# Patient Record
Sex: FEMALE | Race: WHITE | Employment: OTHER | URBAN - METROPOLITAN AREA
[De-identification: names, ages, dates, MRNs, and addresses within clinical notes are randomized per-mention and may not be internally consistent; named-entity substitution may affect disease eponyms.]

---

## 2022-06-22 ENCOUNTER — HOSPITAL ENCOUNTER (INPATIENT)
Age: 87
LOS: 4 days | Discharge: NURSING, REHAB OR LONG-TERM CARE | DRG: 522 | End: 2022-06-27
Attending: EMERGENCY MEDICINE | Admitting: FAMILY MEDICINE
Payer: MEDICARE

## 2022-06-22 DIAGNOSIS — I48.91 ATRIAL FIBRILLATION WITH RVR (HCC): ICD-10-CM

## 2022-06-22 DIAGNOSIS — S72.002A CLOSED DISPLACED FRACTURE OF LEFT FEMORAL NECK (HCC): ICD-10-CM
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
## 2022-06-22 DIAGNOSIS — S72.002A LEFT DISPLACED FEMORAL NECK FRACTURE (HCC): ICD-10-CM

## 2022-06-22 DIAGNOSIS — G89.18 ACUTE POST-OPERATIVE PAIN: ICD-10-CM

## 2022-06-22 PROCEDURE — 85610 PROTHROMBIN TIME: CPT

## 2022-06-22 PROCEDURE — 84484 ASSAY OF TROPONIN QUANT: CPT

## 2022-06-22 PROCEDURE — 99285 EMERGENCY DEPT VISIT HI MDM: CPT

## 2022-06-22 PROCEDURE — 85730 THROMBOPLASTIN TIME PARTIAL: CPT

## 2022-06-22 PROCEDURE — 51702 INSERT TEMP BLADDER CATH: CPT

## 2022-06-22 PROCEDURE — 80048 BASIC METABOLIC PNL TOTAL CA: CPT

## 2022-06-22 PROCEDURE — 36415 COLL VENOUS BLD VENIPUNCTURE: CPT

## 2022-06-22 PROCEDURE — 85025 COMPLETE CBC W/AUTO DIFF WBC: CPT

## 2022-06-22 PROCEDURE — 96374 THER/PROPH/DIAG INJ IV PUSH: CPT

## 2022-06-22 NOTE — LETTER
[address] Medicaid  CERTIFICATION OF NECESSITY  FOR TRANSPORTATION   BY WHEELCHAIR VAN     Individual Information   1. Name: Ilene Garcia 2. [address] [place] [address] Dr Medicaid Billing Number:    3. Address: P.o [facility] Provider Information   4. Provider Name:    5. [address] [place] [address] Dr Medicaid Provider Number:  National Provider Identifier (NPI):      Certification  7. Criteria:  By signing this document, the practitioner certifies that two statements are true:  A. This individual must be accompanied by a mobility-related assistive device from the point of pick-up to the point of drop-off. B. Transport of this individual by standard passenger vehicle or common carrier is precluded or contraindicated. 8. Period Beginning Date: 6/27/22   9. Length  [x] Not more than 1 day(s)  [] One Year     Additional Information Relevant to Certification   10. Comments or Explanations, If Necessary or Appropriate    Atrial fibrillation with RVR ([address] Left displaced femoral neck fracture ([address] Fall, on 2L NC,  L hip hemiarthroplasty 6/80     Certifying Practitioner Information   11. Name of Practitioner: Bhavani Bai MD   12. [address] [place] [address] Dr Medicaid Provider Number, If Applicable:  Ivan Bautista Provider Identifier (NPI):      Signature Information   14. Date of Signature: 6/27/22 15. Name of Person Signing: Angus Moreno   12. Signature and Professional Designation: Electronically signed by TAYLOR Ag on 6/27/2022 at 12:54 PM     OD 15157  Rev. 7/2015    [address] Encounter Date/Time: 6/22/2022 [address] Account: [de-identified]    MRN: [id number]    Patient: Ilene Garcia    Contact Serial #: [id number]      ENCOUNTER          Patient Class: I Private Enc? No Unit RM BDAmy [facility] 6406/6406-A   Hospital Service: MED   Encounter DX:  Atrial fibrillation with*   ADM Provider: Sugey Page DO   Procedure:     ATT Provider: Bhavani Bai MD   REF Provider:        Admission DX: Atrial fibrillation with RVR ([facility] Left displaced femoral neck fracture ([facility] Fall, initial encounter and DX codes: I48.91, S72.002A, W19. Dony Hong      PATIENT                 Name: Mann Hinojosa : 1935 (87 yrs)   Address: [facility] 5 Sex: Female   City: [address] Marital Status:    Employer: RETIRED         Hindu: [id number]   Primary Care Provider: Bibiana Aceves MD         Primary Phone: [phone]   EMERGENCY CONTACT   Contact Name Legal Guardian? Relationship to Patient Home Phone Work Phone   1. Heather Lion  2. Mayra Hartmanjuana No  No Child  Child [phone]              GUARANTOR            Guarantor: Mann Hinojosa     : 1935   Address: [address] Sex: Female     [place] [address]     Relation to Patient: Self       Home Phone: [phone]   Guarantor ID: [id number]       Work Phone:     Guarantor Employer: RETIRED         Status: RETIRED      COVERAGE        PRIMARY INSURANCE   Payor: [address] MEDICARE Plan: [address] MEDICARE ADVANTAGE*   Payor Address: [facility] [id number], [place] [address]       Group Number: [id number] Insurance Type: [id number] Name: Guy Dill : 1935   Subscriber ID: [id number] [facility]. Rel. to Sub: Self   SECONDARY INSURANCE   Payor:   Plan:     Payor Address:  ,           Group Number:   Insurance Type:     Subscriber Name:   Subscriber :     Subscriber ID:   Pat.  Rel. to Sub:             CSN: [id number]

## 2022-06-23 ENCOUNTER — APPOINTMENT (OUTPATIENT)
Dept: GENERAL RADIOLOGY | Age: 87
DRG: 522 | End: 2022-06-23
Payer: MEDICARE

## 2022-06-23 ENCOUNTER — ANESTHESIA EVENT (OUTPATIENT)
Dept: OPERATING ROOM | Age: 87
DRG: 522 | End: 2022-06-23
Payer: MEDICARE

## 2022-06-23 ENCOUNTER — ANESTHESIA (OUTPATIENT)
Dept: OPERATING ROOM | Age: 87
DRG: 522 | End: 2022-06-23
Payer: MEDICARE

## 2022-06-23 PROBLEM — I48.91 ATRIAL FIBRILLATION WITH RVR (HCC): Status: ACTIVE | Noted: 2022-06-23

## 2022-06-23 LAB
ALBUMIN SERPL-MCNC: 4.3 G/DL (ref 3.5–5.2)
ALP BLD-CCNC: 72 U/L (ref 35–104)
ALT SERPL-CCNC: 17 U/L (ref 0–32)
ANION GAP SERPL CALCULATED.3IONS-SCNC: 14 MMOL/L (ref 7–16)
ANION GAP SERPL CALCULATED.3IONS-SCNC: 15 MMOL/L (ref 7–16)
APTT: 35.5 SEC (ref 24.5–35.1)
AST SERPL-CCNC: 21 U/L (ref 0–31)
BASOPHILS ABSOLUTE: 0.03 E9/L (ref 0–0.2)
BASOPHILS ABSOLUTE: 0.04 E9/L (ref 0–0.2)
BASOPHILS RELATIVE PERCENT: 0.3 % (ref 0–2)
BASOPHILS RELATIVE PERCENT: 0.4 % (ref 0–2)
BILIRUB SERPL-MCNC: 0.9 MG/DL (ref 0–1.2)
BUN BLDV-MCNC: 15 MG/DL (ref 6–23)
BUN BLDV-MCNC: 18 MG/DL (ref 6–23)
CALCIUM SERPL-MCNC: 9.1 MG/DL (ref 8.6–10.2)
CALCIUM SERPL-MCNC: 9.2 MG/DL (ref 8.6–10.2)
CHLORIDE BLD-SCNC: 101 MMOL/L (ref 98–107)
CHLORIDE BLD-SCNC: 102 MMOL/L (ref 98–107)
CO2: 23 MMOL/L (ref 22–29)
CO2: 25 MMOL/L (ref 22–29)
CREAT SERPL-MCNC: 0.8 MG/DL (ref 0.5–1)
CREAT SERPL-MCNC: 0.9 MG/DL (ref 0.5–1)
EKG ATRIAL RATE: 304 BPM
EKG Q-T INTERVAL: 298 MS
EKG QRS DURATION: 98 MS
EKG QTC CALCULATION (BAZETT): 473 MS
EKG R AXIS: -48 DEGREES
EKG T AXIS: 123 DEGREES
EKG VENTRICULAR RATE: 152 BPM
EOSINOPHILS ABSOLUTE: 0.01 E9/L (ref 0.05–0.5)
EOSINOPHILS ABSOLUTE: 0.18 E9/L (ref 0.05–0.5)
EOSINOPHILS RELATIVE PERCENT: 0.1 % (ref 0–6)
EOSINOPHILS RELATIVE PERCENT: 1.8 % (ref 0–6)
GFR AFRICAN AMERICAN: >60
GFR AFRICAN AMERICAN: >60
GFR NON-AFRICAN AMERICAN: 60 ML/MIN/1.73
GFR NON-AFRICAN AMERICAN: >60 ML/MIN/1.73
GLUCOSE BLD-MCNC: 154 MG/DL (ref 74–99)
GLUCOSE BLD-MCNC: 172 MG/DL (ref 74–99)
HCT VFR BLD CALC: 40.8 % (ref 34–48)
HCT VFR BLD CALC: 42.2 % (ref 34–48)
HEMOGLOBIN: 13.3 G/DL (ref 11.5–15.5)
HEMOGLOBIN: 13.6 G/DL (ref 11.5–15.5)
IMMATURE GRANULOCYTES #: 0.05 E9/L
IMMATURE GRANULOCYTES #: 0.06 E9/L
IMMATURE GRANULOCYTES %: 0.5 % (ref 0–5)
IMMATURE GRANULOCYTES %: 0.5 % (ref 0–5)
INR BLD: 2.2
INR BLD: 2.2
LYMPHOCYTES ABSOLUTE: 0.74 E9/L (ref 1.5–4)
LYMPHOCYTES ABSOLUTE: 0.95 E9/L (ref 1.5–4)
LYMPHOCYTES RELATIVE PERCENT: 6.3 % (ref 20–42)
LYMPHOCYTES RELATIVE PERCENT: 9.5 % (ref 20–42)
MCH RBC QN AUTO: 31 PG (ref 26–35)
MCH RBC QN AUTO: 31.3 PG (ref 26–35)
MCHC RBC AUTO-ENTMCNC: 32.2 % (ref 32–34.5)
MCHC RBC AUTO-ENTMCNC: 32.6 % (ref 32–34.5)
MCV RBC AUTO: 96 FL (ref 80–99.9)
MCV RBC AUTO: 96.1 FL (ref 80–99.9)
MONOCYTES ABSOLUTE: 0.51 E9/L (ref 0.1–0.95)
MONOCYTES ABSOLUTE: 0.64 E9/L (ref 0.1–0.95)
MONOCYTES RELATIVE PERCENT: 5.1 % (ref 2–12)
MONOCYTES RELATIVE PERCENT: 5.4 % (ref 2–12)
NEUTROPHILS ABSOLUTE: 10.33 E9/L (ref 1.8–7.3)
NEUTROPHILS ABSOLUTE: 8.29 E9/L (ref 1.8–7.3)
NEUTROPHILS RELATIVE PERCENT: 82.7 % (ref 43–80)
NEUTROPHILS RELATIVE PERCENT: 87.4 % (ref 43–80)
PDW BLD-RTO: 13.2 FL (ref 11.5–15)
PDW BLD-RTO: 13.2 FL (ref 11.5–15)
PLATELET # BLD: 187 E9/L (ref 130–450)
PLATELET # BLD: 189 E9/L (ref 130–450)
PMV BLD AUTO: 10.1 FL (ref 7–12)
PMV BLD AUTO: 9.9 FL (ref 7–12)
POTASSIUM REFLEX MAGNESIUM: 4.2 MMOL/L (ref 3.5–5)
POTASSIUM REFLEX MAGNESIUM: 4.3 MMOL/L (ref 3.5–5)
PROTHROMBIN TIME: 23.9 SEC (ref 9.3–12.4)
PROTHROMBIN TIME: 24.1 SEC (ref 9.3–12.4)
RBC # BLD: 4.25 E12/L (ref 3.5–5.5)
RBC # BLD: 4.39 E12/L (ref 3.5–5.5)
SODIUM BLD-SCNC: 139 MMOL/L (ref 132–146)
SODIUM BLD-SCNC: 141 MMOL/L (ref 132–146)
TOTAL PROTEIN: 7 G/DL (ref 6.4–8.3)
TROPONIN, HIGH SENSITIVITY: 17 NG/L (ref 0–9)
TROPONIN, HIGH SENSITIVITY: 20 NG/L (ref 0–9)
WBC # BLD: 10 E9/L (ref 4.5–11.5)
WBC # BLD: 11.8 E9/L (ref 4.5–11.5)

## 2022-06-23 PROCEDURE — 6360000002 HC RX W HCPCS: Performed by: INTERNAL MEDICINE

## 2022-06-23 PROCEDURE — 6360000002 HC RX W HCPCS: Performed by: ORTHOPAEDIC SURGERY

## 2022-06-23 PROCEDURE — 7100000000 HC PACU RECOVERY - FIRST 15 MIN: Performed by: ORTHOPAEDIC SURGERY

## 2022-06-23 PROCEDURE — 3700000000 HC ANESTHESIA ATTENDED CARE: Performed by: ORTHOPAEDIC SURGERY

## 2022-06-23 PROCEDURE — 7100000001 HC PACU RECOVERY - ADDTL 15 MIN: Performed by: ORTHOPAEDIC SURGERY

## 2022-06-23 PROCEDURE — C1713 ANCHOR/SCREW BN/BN,TIS/BN: HCPCS | Performed by: ORTHOPAEDIC SURGERY

## 2022-06-23 PROCEDURE — 85610 PROTHROMBIN TIME: CPT

## 2022-06-23 PROCEDURE — 0SRS0J9 REPLACEMENT OF LEFT HIP JOINT, FEMORAL SURFACE WITH SYNTHETIC SUBSTITUTE, CEMENTED, OPEN APPROACH: ICD-10-PCS | Performed by: ORTHOPAEDIC SURGERY

## 2022-06-23 PROCEDURE — 88311 DECALCIFY TISSUE: CPT

## 2022-06-23 PROCEDURE — 2580000003 HC RX 258: Performed by: FAMILY MEDICINE

## 2022-06-23 PROCEDURE — 99223 1ST HOSP IP/OBS HIGH 75: CPT | Performed by: INTERNAL MEDICINE

## 2022-06-23 PROCEDURE — 2500000003 HC RX 250 WO HCPCS: Performed by: STUDENT IN AN ORGANIZED HEALTH CARE EDUCATION/TRAINING PROGRAM

## 2022-06-23 PROCEDURE — 73502 X-RAY EXAM HIP UNI 2-3 VIEWS: CPT

## 2022-06-23 PROCEDURE — 6360000002 HC RX W HCPCS: Performed by: FAMILY MEDICINE

## 2022-06-23 PROCEDURE — C1776 JOINT DEVICE (IMPLANTABLE): HCPCS | Performed by: ORTHOPAEDIC SURGERY

## 2022-06-23 PROCEDURE — 85025 COMPLETE CBC W/AUTO DIFF WBC: CPT

## 2022-06-23 PROCEDURE — 88305 TISSUE EXAM BY PATHOLOGIST: CPT

## 2022-06-23 PROCEDURE — 2500000003 HC RX 250 WO HCPCS

## 2022-06-23 PROCEDURE — 2580000003 HC RX 258: Performed by: ORTHOPAEDIC SURGERY

## 2022-06-23 PROCEDURE — 2580000003 HC RX 258: Performed by: STUDENT IN AN ORGANIZED HEALTH CARE EDUCATION/TRAINING PROGRAM

## 2022-06-23 PROCEDURE — 2500000003 HC RX 250 WO HCPCS: Performed by: EMERGENCY MEDICINE

## 2022-06-23 PROCEDURE — 73552 X-RAY EXAM OF FEMUR 2/>: CPT

## 2022-06-23 PROCEDURE — 2709999900 HC NON-CHARGEABLE SUPPLY: Performed by: ORTHOPAEDIC SURGERY

## 2022-06-23 PROCEDURE — 6370000000 HC RX 637 (ALT 250 FOR IP): Performed by: FAMILY MEDICINE

## 2022-06-23 PROCEDURE — 36415 COLL VENOUS BLD VENIPUNCTURE: CPT

## 2022-06-23 PROCEDURE — 2580000003 HC RX 258

## 2022-06-23 PROCEDURE — 3600000005 HC SURGERY LEVEL 5 BASE: Performed by: ORTHOPAEDIC SURGERY

## 2022-06-23 PROCEDURE — 84484 ASSAY OF TROPONIN QUANT: CPT

## 2022-06-23 PROCEDURE — 6370000000 HC RX 637 (ALT 250 FOR IP)

## 2022-06-23 PROCEDURE — 6360000002 HC RX W HCPCS

## 2022-06-23 PROCEDURE — 3700000001 HC ADD 15 MINUTES (ANESTHESIA): Performed by: ORTHOPAEDIC SURGERY

## 2022-06-23 PROCEDURE — 2140000000 HC CCU INTERMEDIATE R&B

## 2022-06-23 PROCEDURE — 71045 X-RAY EXAM CHEST 1 VIEW: CPT

## 2022-06-23 PROCEDURE — 3600000015 HC SURGERY LEVEL 5 ADDTL 15MIN: Performed by: ORTHOPAEDIC SURGERY

## 2022-06-23 PROCEDURE — 80053 COMPREHEN METABOLIC PANEL: CPT

## 2022-06-23 PROCEDURE — 2700000000 HC OXYGEN THERAPY PER DAY

## 2022-06-23 PROCEDURE — A4217 STERILE WATER/SALINE, 500 ML: HCPCS | Performed by: ORTHOPAEDIC SURGERY

## 2022-06-23 PROCEDURE — 93005 ELECTROCARDIOGRAM TRACING: CPT | Performed by: EMERGENCY MEDICINE

## 2022-06-23 DEVICE — HEAD BPLR OD48MM ID28MM FEM HIP SELF CNTR: Type: IMPLANTABLE DEVICE | Site: HIP | Status: FUNCTIONAL

## 2022-06-23 DEVICE — STEM FEM SZ 3 L108MM NK L32MM 38MM OFFSET 130DEG BILAT HIP: Type: IMPLANTABLE DEVICE | Site: HIP | Status: FUNCTIONAL

## 2022-06-23 DEVICE — CEMENT BNE GENTAMICIN 40 GM SMARTSET GMV: Type: IMPLANTABLE DEVICE | Site: HIP | Status: FUNCTIONAL

## 2022-06-23 DEVICE — IMPL HIP FEM HEAD TAPR 12/14 28MM +5: Type: IMPLANTABLE DEVICE | Site: HIP | Status: FUNCTIONAL

## 2022-06-23 DEVICE — CENTRALIZER STEM DIA9.25MM DST FEM CEM MOLD SUMMIT BASIC: Type: IMPLANTABLE DEVICE | Site: HIP | Status: FUNCTIONAL

## 2022-06-23 DEVICE — RESTRICTOR CEM M DIA24MM UNIV POLYMER IM INSRT DISP: Type: IMPLANTABLE DEVICE | Site: HIP | Status: FUNCTIONAL

## 2022-06-23 RX ORDER — DILTIAZEM HYDROCHLORIDE 5 MG/ML
10 INJECTION INTRAVENOUS ONCE
Status: COMPLETED | OUTPATIENT
Start: 2022-06-23 | End: 2022-06-23

## 2022-06-23 RX ORDER — FENTANYL CITRATE 50 UG/ML
INJECTION, SOLUTION INTRAMUSCULAR; INTRAVENOUS PRN
Status: DISCONTINUED | OUTPATIENT
Start: 2022-06-23 | End: 2022-06-23 | Stop reason: SDUPTHER

## 2022-06-23 RX ORDER — MECLIZINE HYDROCHLORIDE 25 MG/1
25 TABLET ORAL 3 TIMES DAILY PRN
COMMUNITY

## 2022-06-23 RX ORDER — SODIUM CHLORIDE 0.9 % (FLUSH) 0.9 %
10 SYRINGE (ML) INJECTION PRN
Status: DISCONTINUED | OUTPATIENT
Start: 2022-06-23 | End: 2022-06-27 | Stop reason: HOSPADM

## 2022-06-23 RX ORDER — ONDANSETRON 2 MG/ML
4 INJECTION INTRAMUSCULAR; INTRAVENOUS
Status: DISCONTINUED | OUTPATIENT
Start: 2022-06-23 | End: 2022-06-23 | Stop reason: HOSPADM

## 2022-06-23 RX ORDER — LANOLIN ALCOHOL/MO/W.PET/CERES
800 CREAM (GRAM) TOPICAL DAILY
COMMUNITY

## 2022-06-23 RX ORDER — MORPHINE SULFATE 2 MG/ML
2 INJECTION, SOLUTION INTRAMUSCULAR; INTRAVENOUS
Status: DISCONTINUED | OUTPATIENT
Start: 2022-06-23 | End: 2022-06-27 | Stop reason: HOSPADM

## 2022-06-23 RX ORDER — ONDANSETRON 2 MG/ML
INJECTION INTRAMUSCULAR; INTRAVENOUS PRN
Status: DISCONTINUED | OUTPATIENT
Start: 2022-06-23 | End: 2022-06-23 | Stop reason: SDUPTHER

## 2022-06-23 RX ORDER — SERTRALINE HYDROCHLORIDE 100 MG/1
100 TABLET, FILM COATED ORAL DAILY
Status: DISCONTINUED | OUTPATIENT
Start: 2022-06-23 | End: 2022-06-27 | Stop reason: HOSPADM

## 2022-06-23 RX ORDER — ACETAMINOPHEN 650 MG/1
650 SUPPOSITORY RECTAL EVERY 6 HOURS PRN
Status: DISCONTINUED | OUTPATIENT
Start: 2022-06-23 | End: 2022-06-25

## 2022-06-23 RX ORDER — PROMETHAZINE HYDROCHLORIDE 12.5 MG/1
12.5 TABLET ORAL EVERY 6 HOURS PRN
Status: DISCONTINUED | OUTPATIENT
Start: 2022-06-23 | End: 2022-06-27 | Stop reason: HOSPADM

## 2022-06-23 RX ORDER — BUPROPION HYDROCHLORIDE 200 MG/1
200 TABLET, EXTENDED RELEASE ORAL DAILY
COMMUNITY
Start: 2022-06-11

## 2022-06-23 RX ORDER — SODIUM CHLORIDE 0.9 % (FLUSH) 0.9 %
10 SYRINGE (ML) INJECTION EVERY 12 HOURS SCHEDULED
Status: DISCONTINUED | OUTPATIENT
Start: 2022-06-23 | End: 2022-06-27 | Stop reason: HOSPADM

## 2022-06-23 RX ORDER — BUPROPION HYDROCHLORIDE 100 MG/1
200 TABLET, EXTENDED RELEASE ORAL DAILY
Status: DISCONTINUED | OUTPATIENT
Start: 2022-06-23 | End: 2022-06-27 | Stop reason: HOSPADM

## 2022-06-23 RX ORDER — DILTIAZEM HYDROCHLORIDE 180 MG/1
180 CAPSULE, COATED, EXTENDED RELEASE ORAL 2 TIMES DAILY
Status: DISCONTINUED | OUTPATIENT
Start: 2022-06-23 | End: 2022-06-27 | Stop reason: HOSPADM

## 2022-06-23 RX ORDER — DIGOXIN 0.25 MG/ML
500 INJECTION INTRAMUSCULAR; INTRAVENOUS ONCE
Status: COMPLETED | OUTPATIENT
Start: 2022-06-23 | End: 2022-06-23

## 2022-06-23 RX ORDER — OXYCODONE HYDROCHLORIDE AND ACETAMINOPHEN 5; 325 MG/1; MG/1
1 TABLET ORAL EVERY 6 HOURS PRN
Status: DISCONTINUED | OUTPATIENT
Start: 2022-06-23 | End: 2022-06-27 | Stop reason: HOSPADM

## 2022-06-23 RX ORDER — SODIUM CHLORIDE 9 MG/ML
INJECTION, SOLUTION INTRAVENOUS PRN
Status: DISCONTINUED | OUTPATIENT
Start: 2022-06-23 | End: 2022-06-23 | Stop reason: HOSPADM

## 2022-06-23 RX ORDER — PHENYLEPHRINE HCL IN 0.9% NACL 1 MG/10 ML
SYRINGE (ML) INTRAVENOUS PRN
Status: DISCONTINUED | OUTPATIENT
Start: 2022-06-23 | End: 2022-06-23 | Stop reason: SDUPTHER

## 2022-06-23 RX ORDER — SODIUM CHLORIDE 9 MG/ML
INJECTION, SOLUTION INTRAVENOUS PRN
Status: DISCONTINUED | OUTPATIENT
Start: 2022-06-23 | End: 2022-06-27 | Stop reason: HOSPADM

## 2022-06-23 RX ORDER — CALCIUM CARBONATE 500(1250)
200 TABLET ORAL DAILY
COMMUNITY

## 2022-06-23 RX ORDER — ROCURONIUM BROMIDE 10 MG/ML
INJECTION, SOLUTION INTRAVENOUS PRN
Status: DISCONTINUED | OUTPATIENT
Start: 2022-06-23 | End: 2022-06-23 | Stop reason: SDUPTHER

## 2022-06-23 RX ORDER — DEXAMETHASONE SODIUM PHOSPHATE 10 MG/ML
INJECTION INTRAMUSCULAR; INTRAVENOUS PRN
Status: DISCONTINUED | OUTPATIENT
Start: 2022-06-23 | End: 2022-06-23 | Stop reason: SDUPTHER

## 2022-06-23 RX ORDER — WARFARIN SODIUM 5 MG/1
5 TABLET ORAL
Status: ON HOLD | COMMUNITY
Start: 2022-05-24 | End: 2022-06-27 | Stop reason: HOSPADM

## 2022-06-23 RX ORDER — SODIUM CHLORIDE 9 MG/ML
INJECTION, SOLUTION INTRAVENOUS CONTINUOUS PRN
Status: DISCONTINUED | OUTPATIENT
Start: 2022-06-23 | End: 2022-06-23 | Stop reason: SDUPTHER

## 2022-06-23 RX ORDER — POLYETHYLENE GLYCOL 3350 17 G/17G
17 POWDER, FOR SOLUTION ORAL DAILY PRN
Status: DISCONTINUED | OUTPATIENT
Start: 2022-06-23 | End: 2022-06-27 | Stop reason: HOSPADM

## 2022-06-23 RX ORDER — VANCOMYCIN HYDROCHLORIDE 1 G/20ML
INJECTION, POWDER, LYOPHILIZED, FOR SOLUTION INTRAVENOUS PRN
Status: DISCONTINUED | OUTPATIENT
Start: 2022-06-23 | End: 2022-06-23 | Stop reason: HOSPADM

## 2022-06-23 RX ORDER — MEPERIDINE HYDROCHLORIDE 25 MG/ML
12.5 INJECTION INTRAMUSCULAR; INTRAVENOUS; SUBCUTANEOUS
Status: DISCONTINUED | OUTPATIENT
Start: 2022-06-23 | End: 2022-06-23 | Stop reason: HOSPADM

## 2022-06-23 RX ORDER — OXYCODONE HYDROCHLORIDE AND ACETAMINOPHEN 5; 325 MG/1; MG/1
1 TABLET ORAL EVERY 6 HOURS PRN
Qty: 28 TABLET | Refills: 0 | Status: SHIPPED | OUTPATIENT
Start: 2022-06-23 | End: 2022-06-30

## 2022-06-23 RX ORDER — SODIUM CHLORIDE 0.9 % (FLUSH) 0.9 %
5-40 SYRINGE (ML) INJECTION PRN
Status: DISCONTINUED | OUTPATIENT
Start: 2022-06-23 | End: 2022-06-23 | Stop reason: HOSPADM

## 2022-06-23 RX ORDER — WARFARIN SODIUM 2.5 MG/1
2.5 TABLET ORAL
Status: ON HOLD | COMMUNITY
End: 2022-06-27 | Stop reason: HOSPADM

## 2022-06-23 RX ORDER — SODIUM CHLORIDE 9 MG/ML
INJECTION, SOLUTION INTRAVENOUS CONTINUOUS
Status: DISCONTINUED | OUTPATIENT
Start: 2022-06-23 | End: 2022-06-25

## 2022-06-23 RX ORDER — SODIUM CHLORIDE 0.9 % (FLUSH) 0.9 %
5-40 SYRINGE (ML) INJECTION EVERY 12 HOURS SCHEDULED
Status: DISCONTINUED | OUTPATIENT
Start: 2022-06-23 | End: 2022-06-23 | Stop reason: HOSPADM

## 2022-06-23 RX ORDER — SERTRALINE HYDROCHLORIDE 100 MG/1
100 TABLET, FILM COATED ORAL DAILY
COMMUNITY

## 2022-06-23 RX ORDER — ACETAMINOPHEN 325 MG/1
650 TABLET ORAL EVERY 6 HOURS PRN
Status: DISCONTINUED | OUTPATIENT
Start: 2022-06-23 | End: 2022-06-25

## 2022-06-23 RX ORDER — LIDOCAINE HYDROCHLORIDE 20 MG/ML
INJECTION, SOLUTION EPIDURAL; INFILTRATION; INTRACAUDAL; PERINEURAL PRN
Status: DISCONTINUED | OUTPATIENT
Start: 2022-06-23 | End: 2022-06-23 | Stop reason: SDUPTHER

## 2022-06-23 RX ORDER — PROPOFOL 10 MG/ML
INJECTION, EMULSION INTRAVENOUS PRN
Status: DISCONTINUED | OUTPATIENT
Start: 2022-06-23 | End: 2022-06-23 | Stop reason: SDUPTHER

## 2022-06-23 RX ORDER — FENTANYL CITRATE 50 UG/ML
25 INJECTION, SOLUTION INTRAMUSCULAR; INTRAVENOUS
Status: DISCONTINUED | OUTPATIENT
Start: 2022-06-23 | End: 2022-06-23

## 2022-06-23 RX ORDER — ONDANSETRON 2 MG/ML
4 INJECTION INTRAMUSCULAR; INTRAVENOUS EVERY 6 HOURS PRN
Status: DISCONTINUED | OUTPATIENT
Start: 2022-06-23 | End: 2022-06-27 | Stop reason: HOSPADM

## 2022-06-23 RX ORDER — DILTIAZEM HYDROCHLORIDE 180 MG/1
180 CAPSULE, COATED, EXTENDED RELEASE ORAL 2 TIMES DAILY
COMMUNITY
Start: 2022-06-16

## 2022-06-23 RX ADMIN — DIGOXIN 500 MCG: 0.25 INJECTION INTRAMUSCULAR; INTRAVENOUS at 12:43

## 2022-06-23 RX ADMIN — MORPHINE SULFATE 2 MG: 2 INJECTION, SOLUTION INTRAMUSCULAR; INTRAVENOUS at 22:08

## 2022-06-23 RX ADMIN — ONDANSETRON 4 MG: 2 INJECTION INTRAMUSCULAR; INTRAVENOUS at 18:03

## 2022-06-23 RX ADMIN — FENTANYL CITRATE 50 MCG: 50 INJECTION, SOLUTION INTRAMUSCULAR; INTRAVENOUS at 16:45

## 2022-06-23 RX ADMIN — TRANEXAMIC ACID 1000 MG: 1 INJECTION, SOLUTION INTRAVENOUS at 20:00

## 2022-06-23 RX ADMIN — Medication 100 MCG: at 17:42

## 2022-06-23 RX ADMIN — SUGAMMADEX 200 MG: 100 INJECTION, SOLUTION INTRAVENOUS at 18:30

## 2022-06-23 RX ADMIN — FENTANYL CITRATE 50 MCG: 50 INJECTION, SOLUTION INTRAMUSCULAR; INTRAVENOUS at 17:13

## 2022-06-23 RX ADMIN — SODIUM CHLORIDE: 9 INJECTION, SOLUTION INTRAVENOUS at 05:34

## 2022-06-23 RX ADMIN — MORPHINE SULFATE 2 MG: 2 INJECTION, SOLUTION INTRAMUSCULAR; INTRAVENOUS at 10:56

## 2022-06-23 RX ADMIN — OXYCODONE AND ACETAMINOPHEN 1 TABLET: 5; 325 TABLET ORAL at 09:10

## 2022-06-23 RX ADMIN — SODIUM CHLORIDE: 9 INJECTION, SOLUTION INTRAVENOUS at 19:53

## 2022-06-23 RX ADMIN — DILTIAZEM HYDROCHLORIDE 10 MG: 5 INJECTION INTRAVENOUS at 01:35

## 2022-06-23 RX ADMIN — ONDANSETRON HYDROCHLORIDE 4 MG: 2 SOLUTION INTRAMUSCULAR; INTRAVENOUS at 05:36

## 2022-06-23 RX ADMIN — Medication 10 ML: at 22:11

## 2022-06-23 RX ADMIN — Medication 10 ML: at 09:00

## 2022-06-23 RX ADMIN — DEXTROSE MONOHYDRATE 2.5 MG/HR: 50 INJECTION, SOLUTION INTRAVENOUS at 05:36

## 2022-06-23 RX ADMIN — DEXAMETHASONE SODIUM PHOSPHATE 10 MG: 10 INJECTION INTRAMUSCULAR; INTRAVENOUS at 16:45

## 2022-06-23 RX ADMIN — Medication 100 MG: at 16:45

## 2022-06-23 RX ADMIN — ROCURONIUM BROMIDE 50 MG: 10 SOLUTION INTRAVENOUS at 16:45

## 2022-06-23 RX ADMIN — SODIUM CHLORIDE: 9 INJECTION, SOLUTION INTRAVENOUS at 16:40

## 2022-06-23 RX ADMIN — ROCURONIUM BROMIDE 10 MG: 10 SOLUTION INTRAVENOUS at 17:25

## 2022-06-23 RX ADMIN — DILTIAZEM HYDROCHLORIDE 180 MG: 180 CAPSULE, EXTENDED RELEASE ORAL at 22:08

## 2022-06-23 RX ADMIN — PROPOFOL 70 MG: 10 INJECTION, EMULSION INTRAVENOUS at 16:45

## 2022-06-23 RX ADMIN — MORPHINE SULFATE 2 MG: 2 INJECTION, SOLUTION INTRAMUSCULAR; INTRAVENOUS at 15:21

## 2022-06-23 RX ADMIN — FENTANYL CITRATE 25 MCG: 0.05 INJECTION, SOLUTION INTRAMUSCULAR; INTRAVENOUS at 05:37

## 2022-06-23 RX ADMIN — SODIUM CHLORIDE: 9 INJECTION, SOLUTION INTRAVENOUS at 18:35

## 2022-06-23 RX ADMIN — DILTIAZEM HYDROCHLORIDE 10 MG: 5 INJECTION, SOLUTION INTRAVENOUS at 00:35

## 2022-06-23 RX ADMIN — TRANEXAMIC ACID 1000 MG: 1 INJECTION, SOLUTION INTRAVENOUS at 17:04

## 2022-06-23 RX ADMIN — SERTRALINE 100 MG: 100 TABLET, FILM COATED ORAL at 22:08

## 2022-06-23 RX ADMIN — FENTANYL CITRATE 50 MCG: 50 INJECTION, SOLUTION INTRAMUSCULAR; INTRAVENOUS at 17:29

## 2022-06-23 ASSESSMENT — PAIN DESCRIPTION - LOCATION
LOCATION: HIP
LOCATION: HIP
LOCATION: LEG
LOCATION: HIP

## 2022-06-23 ASSESSMENT — PAIN SCALES - GENERAL
PAINLEVEL_OUTOF10: 0
PAINLEVEL_OUTOF10: 6
PAINLEVEL_OUTOF10: 10

## 2022-06-23 ASSESSMENT — PAIN DESCRIPTION - DESCRIPTORS
DESCRIPTORS: ACHING
DESCRIPTORS: SHARP;SHOOTING;SORE
DESCRIPTORS: ACHING
DESCRIPTORS: ACHING

## 2022-06-23 ASSESSMENT — PAIN DESCRIPTION - ORIENTATION
ORIENTATION: LEFT

## 2022-06-23 ASSESSMENT — LIFESTYLE VARIABLES: SMOKING_STATUS: 0

## 2022-06-23 NOTE — PROGRESS NOTES
Plan for operative treatment of her left hip as soon as today, 6/23/2022, provided this is deemed appropriate by the medical and cardiology services. Appreciate all recommendations and continued management of co-morbidities.

## 2022-06-23 NOTE — ED NOTES
Report called to [facility] [place], RN. No further questions at this time. Pt placed for transport.       Rosa Gonzalez RN  06/23/22 0730

## 2022-06-23 NOTE — ED NOTES
Pt was awaiting transport to floor with RN due to IV infusion. While primary RN was with another pt, Transport left with pt, without belongings, and prior to checking with primary RN to okay transport.       Cory Cardenas RN  06/23/22 6742

## 2022-06-23 NOTE — OP NOTE
Operative Report  Roberto Moreno  [id number]  6/23/2022    Pre-operative diagnosis: Displaced left femoral neck fracture    Post-operative diagnosis: Displaced left femoral neck fracture    Procedure: Left hip hemiarthroplasty    Surgeon: Haley Canales MD    Assistant:  Pamela Jerry DO; PGY-3    Anesthesia:  General    Operative Indication:  82 y.o. female who fell sustaining a displaced left femoral neck fracture. Patient was admitted for pain control, medical optimization and definitive surgical treatment. The rationale behind hip hemiarthroplasty as a means of fracture treatment and early mobilization / rehabilitation was explained and informed consent was obtained following a thorough review of risks, benefits, and alternative treatment options. The typical post-operative recovery process was also outlined. The risks for surgery include bleeding, infection, damage to blood vessels, damage to nerves, risk of further surgery, chronic pain, restricted range of motion, risk of continued discomfort, risk of malunion, risk of nonunion, risk of need for further reconstructive procedures, risk of need for altered activities and altered gait, risk of blood clots, pulmonary embolism, myocardial infarction, and risk of death were discussed. The patient understood these risks and consented for surgery. EBL: 596 cc    Complications: None    Components:  1. Depuy summit cemented stem, size 3                            2. Depuy bipolar head Component, size 48 mm       Operative Procedure: Following general anesthesia, the patient was positioned lateral decubitus with the body supported by a peg board. The affected leg and hip were prepped and draped in the usual standard fashion. The down leg was padded and protected. The hip and groin were sealed with Gladys [place]. An intra-operative time out was called to confirm the planned surgical procedure and administration of IV antibiotic.     A skin incision was made centered over the greater trochanter. A posterior approach to the hip joint was used. The IT band and gluteus fascia were split and a Charnley retractor was placed for deep exposure. Bursal tissue was cleared over the trochanter and short external rotators. An assistant internally rotated the hip and a erwin retractor was placed beneath the gluteus medius muscle. The plane between gluteus minimus and the piriformis tendon was developed using electrocautery. The piriformis and short external rotator tendons were released and tagged with #5 ethibond, along with release of a portion of the quadratus femoris muscle. The capsule was then incised in a T-fashion. It was tagged with #1 ethibond and deep retractors were repositioned. The fracture pattern was that of a displaced subcapital femoral neck with mild comminution at the primary fracture site. The bone appeared non-pathologic. The femoral neck was cut one fingerbreadth above the lesser trochanter using an oscillating saw. The femoral head was removed and was sized. A lollipop trial was then placed to confirm head size. With a size 48 trial head, a good suction fit was obtained. The acetabulum was inspected to make sure there were no free bone fragments. The labrum was intact. The articular cartilage of the cup was in good condition    Attention was now turned to the femoral canal.  The canal was entered using a box osteotome, followed by placement of a . Broaching was initiated with a size 1 broach and was taken up to a size 3 broach in 1mm increments. With the final broach in place, there was good canal fill and the implant was stable with rotation. This was decided to be the appropriate size stem. A size 3 Depuy summit stem was then cemented using modern cement techniques and trialed with a standard neck length and a size 48 bipolar head. With these components, the hip was stable and had excellent ROM.  Final components were then impacted onto the stem and the hip was again reduced. The surgical site was irrigated using pulsatile lavage. The capsule and external rotators were repaired with #5 ethibond through 2 drill holes in the greater trochanter. The IT band and gluteus fascia were closed using #1 Stratafix. The skin was closed in layers using 0, 2-0, vicryl, dermabond and steri strips. A dry, sterile dressing was applied. The patient was then transferred to an orthopaedic bed with an abduction pillow between the legs. The patient was taken to recovery in stable condition.     Celena Whittaker MD

## 2022-06-23 NOTE — PROGRESS NOTES
Hospitalist Progress Note      SYNOPSIS: Patient admitted on 2022 for     Patient presented to the emergency department after a fall. Patient was outside on a swing when she got up to walk back to the house and she tripped falling onto her left side. There is no injury to her head and no loss of consciousness. She fell on her left hip. Now complaining of left hip pain. Denies any chest pain, shortness of breath, fever, chills, nausea, vomiting. Patient's vital signs reveal tachycardia with a rate of 150. EKG showed atrial fibrillation with RVR. Remainder vital signs within normal limits and stable. Patient has a history of atrial fibrillation. Was given diltiazem in the emergency department. Rates currently in the 120s. Patient is chronically anticoagulated on Coumadin. Laboratory studies show an INR of 2.2. X-rays show acute displaced fracture of the left femoral neck. Orthopedics was consulted. Medicine consulted for admission.         SUBJECTIVE:    Patient seen and examined in her room. Still complains of left hip pain, barely controlled. Denies any chest pain, nausea, vomiting  Records reviewed. Stable overnight. No other overnight issues reported. Temp (24hrs), Av.5 °F (36.9 °C), Min:98.2 °F (36.8 °C), Max:98.7 °F (37.1 °C)    DIET: Diet NPO  CODE: Full Code    Intake/Output Summary (Last 24 hours) at 2022 0818  Last data filed at 2022 0706  Gross per 24 hour   Intake --   Output 500 ml   Net -500 ml       OBJECTIVE:    BP (!) 118/94   Pulse (!) 104   Temp 98.2 °F (36.8 °C) (Oral)   Resp 24   Ht 5' 6\" (1.676 m)   Wt 200 lb (90.7 kg)   SpO2 96%   BMI 32.28 kg/m²     General appearance: No apparent distress, appears stated age and cooperative. HEENT:  Conjunctivae/corneas clear. Neck: Supple. No jugular venous distention.    Respiratory: Clear to auscultation bilaterally, normal respiratory effort  Cardiovascular: Regular rate rhythm, normal S1-S2  Abdomen: Soft, nontender, nondistended  Musculoskeletal: No clubbing, cyanosis, no bilateral lower extremity edema. Brisk capillary refill. Skin:  No rashes  on visible skin  Neurologic: awake, alert and following commands       ASSESSMENT:  -Atrial fibrillation with RVR  -Left femoral neck fracture  -Fall at home  -Chronically anticoagulated on Coumadin        PLAN:  -Admit to medicine  -Consult orthopedic surgery  -Consult cardiology for preoperative evaluation/clearance  -Telemetry  -Pain management  -Hold Coumadin and anticoagulants       DISPOSITION:   Unclear discharge disposition at the moment. Medications:  REVIEWED DAILY    Infusion Medications    sodium chloride      sodium chloride 75 mL/hr at 06/23/22 0534    dilTIAZem 5 mg/hr (06/23/22 0556)     Scheduled Medications    buPROPion  200 mg Oral Daily    dilTIAZem  180 mg Oral BID    sertraline  100 mg Oral Daily    sodium chloride flush  10 mL IntraVENous 2 times per day     PRN Meds: sodium chloride flush, sodium chloride, promethazine **OR** ondansetron, polyethylene glycol, acetaminophen **OR** acetaminophen, oxyCODONE-acetaminophen, fentanNYL    Labs:     Recent Labs     06/22/22  2353 06/23/22  0640   WBC 10.0 11.8*   HGB 13.3 13.6   HCT 40.8 42.2    189       Recent Labs     06/22/22  2353 06/23/22  0640    139   K 4.3 4.2    101   CO2 25 23   BUN 18 15   CREATININE 0.9 0.8   CALCIUM 9.2 9.1       Recent Labs     06/23/22  0640   PROT 7.0   ALKPHOS 72   ALT 17   AST 21   BILITOT 0.9       Recent Labs     06/22/22  2353   INR 2.2       No results for input(s): Anithajony Méndez in the last 72 hours.     Chronic labs:    Lab Results   Component Value Date    INR 2.2 06/22/2022       Radiology: REVIEWED DAILY    +++++++++++++++++++++++++++++++++++++++++++++++++  Elton Villanueva MD  [facility] Physician - [address], [place]  +++++++++++++++++++++++++++++++++++++++++++++++++  NOTE: This report was transcribed using voice recognition software. Every effort was made to ensure accuracy; however, inadvertent computerized transcription errors may be present.

## 2022-06-23 NOTE — CONSULTS
CHIEF COMPLAINT:PAF/Hip fracture    HISTORY OF PRESENT ILLNESS: Patient is a 82 y.o. female seen at the request of Dr. Edward Dave and followed by Dr. Zena Murray for PAF. Patient suffered a fall with hip fracture. Noted to be in atrial flutter with RVR prompting cardiology consultation. No CP, angina or SOB. Only complaint is severe hip pain.      Past Medical History:   Diagnosis Date    Atrial fibrillation [facility])        Patient Active Problem List   Diagnosis    Atrial fibrillation with RVR ([facility] [address]       No Known Allergies    Current Facility-Administered Medications   Medication Dose Route Frequency Provider Last Rate Last Admin    buPROPion [facility]) extended release tablet 200 mg  200 mg Oral Daily [facility], DO        dilTIAZem (CARDIZEM CD) extended release capsule 180 mg  180 mg Oral BID [facility], DO        sertraline (ZOLOFT) tablet 100 mg  100 mg Oral Daily [facility], DO        sodium chloride flush 0.9 % injection 10 mL  10 mL IntraVENous 2 times per day [facility], DO        sodium chloride flush 0.9 % injection 10 mL  10 mL IntraVENous PRN [facility], DO        0.9 % sodium chloride infusion   IntraVENous PRN [facility], DO        promethazine (PHENERGAN) tablet 12.5 mg  12.5 mg Oral Q6H PRN [facility], DO        Or    ondansetron [facility]) injection 4 mg  4 mg IntraVENous Q6H PRN [facility], DO   4 mg at 06/23/22 0536    polyethylene glycol (GLYCOLAX) packet 17 g  17 g Oral Daily PRN [facility], DO        acetaminophen (TYLENOL) tablet 650 mg  650 mg Oral Q6H PRN [facility], DO        Or    acetaminophen (TYLENOL) suppository 650 mg  650 mg Rectal Q6H PRN [facility], DO        0.9 % sodium chloride infusion   IntraVENous Continuous [facility], DO 75 mL/hr at 06/23/22 0534 New Bag at 06/23/22 0534    oxyCODONE-acetaminophen (PERCOCET) 5-325 MG per tablet 1 tablet  1 tablet Oral Q6H PRN [facility], DO        fentaNYL (SUBLIMAZE) injection 25 mcg  25 mcg IntraVENous Q1H PRN Berdie Class, DO   25 mcg at 06/23/22 0537    dilTIAZem 100 mg in dextrose 5 % 100 mL infusion ([facility])  2.5-15 mg/hr IntraVENous Continuous Sundra Arrant, DO 5 mL/hr at 06/23/22 0556 5 mg/hr at 06/23/22 0556       Social History     Socioeconomic History    Marital status:      Spouse name: Not on file    Number of children: Not on file    Years of education: Not on file    Highest education level: Not on file   Occupational History    Not on file   Tobacco Use    Smoking status: Never Smoker    Smokeless tobacco: Never Used   Substance and Sexual Activity    Alcohol use: Not Currently    Drug use: Never    Sexual activity: Not on file   Other Topics Concern    Not on file   Social History Narrative    Not on file     Social Determinants of Health     Financial Resource Strain:     Difficulty of Paying Living Expenses: Not on file   Food Insecurity:     Worried About Running Out of Food in the Last Year: Not on file    She of Food in the Last Year: Not on file   Transportation Needs:     Lack of Transportation (Medical): Not on file    Lack of Transportation (Non-Medical):  Not on file   Physical Activity:     Days of Exercise per Week: Not on file    Minutes of Exercise per Session: Not on file   Stress:     Feeling of Stress : Not on file   Social Connections:     Frequency of Communication with Friends and Family: Not on file    Frequency of Social Gatherings with Friends and Family: Not on file    Attends Latter day Services: Not on file    Active Member of Clubs or Organizations: Not on file    Attends Club or Organization Meetings: Not on file    Marital Status: Not on file   Intimate Partner Violence:     Fear of Current or Ex-Partner: Not on file    Emotionally Abused: Not on file    Physically Abused: Not on file    Sexually Abused: Not on file   Housing Stability:     Unable to Pay for Housing in the Last Year: Not on file    Number of Places Lived in the Last Year: Not on file    Unstable Housing in the Last Year: Not on file       History reviewed. No pertinent family history. Review of Systems:   Heart: as above   Lungs: as above   Eyes: denies changes in vision or discharge. Ears: denies changes in hearing or pain. Nose: denies epistaxis or masses   Throat: denies sore throat or trouble swallowing. Neuro: denies numbness, tingling, tremors. Skin: denies rashes or itching. : denies hematuria, dysuria   GI: denies vomiting, diarrhea   Psych: denies mood changed, anxiety, depression. all others negative. Physical Exam   /68   Pulse 94   Temp 97.9 °F (36.6 °C)   Resp 20   Ht 5' 6\" (1.676 m)   Wt 200 lb (90.7 kg)   SpO2 97%   BMI 32.28 kg/m²   Constitutional: Oriented to person, place, and time. Well-developed and well-nourished. No distress. Head: Normocephalic and atraumatic. Eyes: EOM are normal. Pupils are equal, round, and reactive to light. Neck: Normal range of motion. Neck supple. No hepatojugular reflux and no JVD present. Carotid bruit is not present. No tracheal deviation present. No thyromegaly present. Cardiovascular: Normal rate, regular rhythm, normal heart sounds and intact distal pulses. Exam reveals no gallop and no friction rub. No murmur heard. Pulmonary/Chest: Effort normal and breath sounds normal. No respiratory distress. No wheezes. No rales. No tenderness. Abdominal: Soft. Bowel sounds are normal. No distension and no mass. No tenderness. No rebound and no guarding. Musculoskeletal: Normal range of motion. No edema and no tenderness. Lymphadenopathy:   No cervical adenopathy. No groin adenopathy. Neurological: Alert and oriented to person, place, and time. Skin: Skin is warm and dry. No rash noted. Not diaphoretic. No erythema. Psychiatric: Normal mood and affect.  Behavior is normal.     CBC:   Lab Results   Component Value Date    WBC 11.8 06/23/2022    RBC 4.39 06/23/2022    HGB 13.6 06/23/2022    HCT 42.2 06/23/2022    MCV 96.1 06/23/2022    MCH 31.0 06/23/2022    MCHC 32.2 06/23/2022    RDW 13.2 06/23/2022     06/23/2022    MPV 10.1 06/23/2022     BMP:   Lab Results   Component Value Date     06/23/2022    K 4.2 06/23/2022     06/23/2022    CO2 23 06/23/2022    BUN 15 06/23/2022    LABALBU 4.3 06/23/2022    CREATININE 0.8 06/23/2022    CALCIUM 9.1 06/23/2022    GFRAA >60 06/23/2022    LABGLOM >60 06/23/2022     Magnesium:  No results found for: MG  Cardiac Enzymes:   Lab Results   Component Value Date    TROPHS 20 (H) 06/23/2022    TROPHS 17 (H) 06/22/2022      PT/INR:    Lab Results   Component Value Date    PROTIME 23.9 06/22/2022    INR 2.2 06/22/2022     TSH:  No results found for: TSH    Rhythm Strip: Atrial flutter    EKG:  Atrial flutter, nonspecific ST and T waves changes. ASSESSMENT AND PLAN:  Patient Active Problem List   Diagnosis    Atrial fibrillation with RVR ([address]     1. Atrial flutter:     Rate control for now. Typically on warfarin, held for potential surgery. Address restoration of sinus after hip and related pain issue addressed. 2. Fall/Hip Fracture/Pre-op: Patient is an acceptable risk to proceed with surgery as indicated. Ortho consulted. Kristopher Medina D.O.   Cardiologist  Cardiology, [address] [facility]

## 2022-06-23 NOTE — CONSULTS
Orthopaedic Consultation  NGOZI Ewing MD    Reason for Consult:  Left hip pain      HISTORY OF PRESENT ILLNESS:                   Patient is a 82 y.o. female who presents with complaint of left hip pain following mechanical fall earlier today. After the fall she had immediate left hip pain and was unable to ambulate. She does not complain of any pain to any other extremities. She denies any changes in sensation. She has history of right total knee arthroplasty which was completed by surgeon in [place], [place]. On arrival to the ED she was found to be in atrial fibrillation with RVR. She has a history of atrial fibrillation and is on Coumadin. She ambulates with a cane.       Past Medical History:    Past Medical History             Diagnosis Date    Atrial fibrillation [facility])           Past Surgical History:    Past Surgical History   History reviewed. No pertinent surgical history.      Current Medications:     Current Hospital Medications   Current Facility-Administered Medications: buPROPion [facility] SR) extended release tablet 200 mg, 200 mg, Oral, Daily  dilTIAZem (CARDIZEM CD) extended release capsule 180 mg, 180 mg, Oral, BID  sertraline (ZOLOFT) tablet 100 mg, 100 mg, Oral, Daily  sodium chloride flush 0.9 % injection 10 mL, 10 mL, IntraVENous, 2 times per day  sodium chloride flush 0.9 % injection 10 mL, 10 mL, IntraVENous, PRN  0.9 % sodium chloride infusion, , IntraVENous, PRN  promethazine (PHENERGAN) tablet 12.5 mg, 12.5 mg, Oral, Q6H PRN **OR** ondansetron (ZOFRAN) injection 4 mg, 4 mg, IntraVENous, Q6H PRN  polyethylene glycol (GLYCOLAX) packet 17 g, 17 g, Oral, Daily PRN  acetaminophen (TYLENOL) tablet 650 mg, 650 mg, Oral, Q6H PRN **OR** acetaminophen (TYLENOL) suppository 650 mg, 650 mg, Rectal, Q6H PRN  0.9 % sodium chloride infusion, , IntraVENous, Continuous  oxyCODONE-acetaminophen (PERCOCET) 5-325 MG per tablet 1 tablet, 1 tablet, Oral, Q6H PRN  fentaNYL (SUBLIMAZE) injection 25 mcg, 25 mcg, IntraVENous, Q1H PRN  [COMPLETED] dilTIAZem injection 10 mg, 10 mg, IntraVENous, Once **FOLLOWED BY** dilTIAZem 100 mg in dextrose 5 % 100 mL infusion ([facility]), 2.5-15 mg/hr, IntraVENous, Continuous     Allergies:  Patient has no known allergies.     Social History:   TOBACCO:   reports that she has never smoked. She has never used smokeless tobacco.  ETOH:   reports previous alcohol use. DRUGS:   reports no history of drug use. ACTIVITIES OF DAILY LIVING: Ambulates with cane  OCCUPATION: Retired  Family History:   Family History   History reviewed.  No pertinent family history.        REVIEW OF SYSTEMS:  CONSTITUTIONAL:  negative for  fevers, chills  EYES:  negative for blurred vision, visual disturbance  HEENT:  negative for  hearing loss, voice change  RESPIRATORY:  negative for  dyspnea, wheezing  CARDIOVASCULAR:  negative for  chest pain, palpitations  GASTROINTESTINAL:  negative for nausea, vomiting  GENITOURINARY:  negative for frequency, urinary incontinence  HEMATOLOGIC/LYMPHATIC:  negative for bleeding and petechiae  MUSCULOSKELETAL: See HPI  NEUROLOGICAL:  negative for headaches, dizziness  BEHAVIOR/PSYCH:  negative for increased agitation and anxiety     PHYSICAL EXAM:    VITALS:  BP (!) 145/117   Pulse (!) 123   Temp 98.7 °F (37.1 °C)   Resp 21   Ht 5' 6\" (1.676 m)   Wt 200 lb (90.7 kg)   SpO2 94%   BMI 32.28 kg/m²   CONSTITUTIONAL: Hard of hearing, oriented to person and place, cooperative with exam  MUSCULOSKELETAL:  Left lower Extremity:  · Externally rotated left lower extremity with shortening relative to contralateral side  · Pain with logroll, pain with axial loading, no straight leg raise secondary to pain  · Skin intact circumferentially about the extremity, no significant tenderness to palpation about the knee, ankle, or foot  · Sensation grossly intact to the foot in sural, saphenous, tibial, deep peroneal, and superficial peroneal nerve distributions  · Motor function intact to EHL, FHL, gastrocsoleus complex, tibialis anterior  · Dorsalis pedis and posterior tibial pulses are palpable, +2/4  · Compartments soft and compressible     Secondary Exam:   · bilateralUE: No obvious signs of trauma. -TTP to fingers, hand, wrist, forearm, elbow, humerus, shoulder or clavicle. -- Patient able to flex/extend fingers, wrist, elbow and shoulder with active and passive ROM without pain, +2/4 Radial pulse, cap refill <3sec, +AIN/PIN/Radial/Ulnar/Median N, distal sensation grossly intact to C4-T1 dermatomes, compartments soft and compressible.     · rightLE: No obvious signs of trauma. -TTP to foot, ankle, leg, knee, thigh, hip.-- Patient able to flex/extend toes, ankle, knee and hip with active and passive ROM without pain,+2/4 DP & PT pulses, cap refill <3sec, +5/5 PF/DF/EHL, distal sensation grossly intact to L4-S1 dermatomes, compartments soft and compressible.     · Pelvis: -TTP, -Log roll, -Heel strike      DATA:    CBC:         Lab Results   Component Value Date     WBC 10.0 06/22/2022     RBC 4.25 06/22/2022     HGB 13.3 06/22/2022     HCT 40.8 06/22/2022     MCV 96.0 06/22/2022     MCH 31.3 06/22/2022     MCHC 32.6 06/22/2022     RDW 13.2 06/22/2022      06/22/2022     MPV 9.9 06/22/2022      PT/INR:          Lab Results   Component Value Date     PROTIME 23.9 06/22/2022     INR 2.2 06/22/2022         Radiology Review:  X-ray imaging of the left hip and femur reviewed. This demonstrate a transcervical femoral neck fracture. There is complete displacement with shortening into varus.     IMPRESSION:  · Left femoral neck fracture     I had a long discussion with the patient and her family regarding displaced femoral neck fractures. Operative management was discussed. The risks and benefits of surgery were discussed and all questions were answered. She would like to proceed with surgery.     - Medical clearance  - NPO at midnight  - IVF  - NWB LLE  - Pain control  - Plan for left hip hemiarthroplasty     I spent over 50 minutes reviewing the EMR, radiographs, examining the patient, and discussing the injury and treatment plan with the patient and her family.

## 2022-06-23 NOTE — PROGRESS NOTES
Patient seen and examine at bedside. Patient indicated that she is ok with Dr. Brooke Linda performing surgery. She reports some dry mouth. All questions and concerns addressed to patient's satisfaction. Continue plan as indicated in consult note. Patient has been cleared for surgery by cardiology.

## 2022-06-23 NOTE — ED NOTES
Spoke with pt daughter Tommy Hoang) and son in law Mere Lynn). Updated on pt plan of care. They will be coming in around 1130am/1200pm. If any questions call Juan Rodrigez @ [phone] or Lisandra Rashid @ [phone].      Poornima Wheatley RN  06/23/22 1242

## 2022-06-23 NOTE — ED PROVIDER NOTES
HPI:  6/22/22,   Time: 11:49 PM EDT       Keith Guzman is a 82 y.o. female presenting to the ED for fall, beginning just prior to arrival.  The complaint has been intermittent, mild in severity, and worsened by nothing. Patient was outside on the swing with her  when she got up and was walking back to the house she tripped on the ground causing her to lose balance falling onto her left side. Denies any her head. No LOC. Patient states she fell right onto her left hip. Patient complaining of left hip pain. Otherwise denies any chest pain or shortness of breath. No syncope. No numbness or tingling. No abdominal pain. No headaches or vision changes. Review of Systems:   Pertinent positives and negatives are stated within HPI, all other systems reviewed and are negative.          --------------------------------------------- PAST HISTORY ---------------------------------------------  Past Medical History:  has a past medical history of Atrial fibrillation ([facility] [address] Past Surgical History:  has no past surgical history on file. Social History:  reports that she has never smoked. She has never used smokeless tobacco. She reports previous alcohol use. She reports that she does not use drugs. Family History: family history is not on file. The patients home medications have been reviewed. Allergies: Patient has no known allergies.         ---------------------------------------------------PHYSICAL EXAM--------------------------------------    Constitutional/General: Alert and oriented x3, well appearing, non toxic in NAD  Head: Normocephalic and atraumatic  Eyes: PERRL, EOMI, conjunctive normal, sclera non icteric  Mouth: Oropharynx clear, handling secretions, no trismus, no asymmetry of the posterior oropharynx or uvular edema  Neck: Supple, full ROM, non tender to palpation in the midline, no stridor, no crepitus, no meningeal signs  Respiratory: Lungs clear to auscultation bilaterally, no wheezes, rales, or rhonchi. Not in respiratory distress  Cardiovascular:  Regular rate. Regular rhythm. No murmurs, gallops, or rubs. 2+ distal pulses  GI:  Abdomen Soft, Non tender, Non distended. +BS. No organomegaly, no palpable masses,  No rebound, guarding, or rigidity. Musculoskeletal: Moves all extremities x 4. Warm and well perfused, no clubbing, cyanosis, or edema. Capillary refill <3 seconds. Pain to palpation overlying the left hip. Left leg is shortened and externally rotated. 2+ pulses sensation and strength intact distally  Integument: skin warm and dry. No rashes. Lymphatic: no lymphadenopathy noted  Neurologic: GCS 15, no focal deficits, symmetric strength 5/5 in the upper and lower extremities bilaterally  Psychiatric: Normal Affect    -------------------------------------------------- RESULTS -------------------------------------------------  I have personally reviewed all laboratory and imaging results for this patient. Results are listed below.      LABS:  Results for orders placed or performed during the hospital encounter of 06/22/22   CBC with Auto Differential   Result Value Ref Range    WBC 10.0 4.5 - 11.5 E9/L    RBC 4.25 3.50 - 5.50 E12/L    Hemoglobin 13.3 11.5 - 15.5 g/dL    Hematocrit 40.8 34.0 - 48.0 %    MCV 96.0 80.0 - 99.9 fL    MCH 31.3 26.0 - 35.0 pg    MCHC 32.6 32.0 - 34.5 %    RDW 13.2 11.5 - 15.0 fL    Platelets 245 451 - 793 E9/L    MPV 9.9 7.0 - 12.0 fL    Neutrophils % 82.7 (H) 43.0 - 80.0 %    Immature Granulocytes % 0.5 0.0 - 5.0 %    Lymphocytes % 9.5 (L) 20.0 - 42.0 %    Monocytes % 5.1 2.0 - 12.0 %    Eosinophils % 1.8 0.0 - 6.0 %    Basophils % 0.4 0.0 - 2.0 %    Neutrophils Absolute 8.29 (H) 1.80 - 7.30 E9/L    Immature Granulocytes # 0.05 E9/L    Lymphocytes Absolute 0.95 (L) 1.50 - 4.00 E9/L    Monocytes Absolute 0.51 0.10 - 0.95 E9/L    Eosinophils Absolute 0.18 0.05 - 0.50 E9/L    Basophils Absolute 0.04 0.00 - 0.20 G4/Y   Basic Metabolic Panel w/ Reflex to MG   Result Value Ref Range    Sodium 141 132 - 146 mmol/L    Potassium reflex Magnesium 4.3 3.5 - 5.0 mmol/L    Chloride 102 98 - 107 mmol/L    CO2 25 22 - 29 mmol/L    Anion Gap 14 7 - 16 mmol/L    Glucose 154 (H) 74 - 99 mg/dL    BUN 18 6 - 23 mg/dL    CREATININE 0.9 0.5 - 1.0 mg/dL    GFR Non-African American 60 >=60 mL/min/1.73    GFR African American >60     Calcium 9.2 8.6 - 10.2 mg/dL   Protime-INR   Result Value Ref Range    Protime 23.9 (H) 9.3 - 12.4 sec    INR 2.2    APTT   Result Value Ref Range    aPTT 35.5 (H) 24.5 - 35.1 sec   Troponin   Result Value Ref Range    Troponin, High Sensitivity 17 (H) 0 - 9 ng/L   Troponin   Result Value Ref Range    Troponin, High Sensitivity 20 (H) 0 - 9 ng/L   Comprehensive Metabolic Panel w/ Reflex to MG   Result Value Ref Range    Sodium 139 132 - 146 mmol/L    Potassium reflex Magnesium 4.2 3.5 - 5.0 mmol/L    Chloride 101 98 - 107 mmol/L    CO2 23 22 - 29 mmol/L    Anion Gap 15 7 - 16 mmol/L    Glucose 172 (H) 74 - 99 mg/dL    BUN 15 6 - 23 mg/dL    CREATININE 0.8 0.5 - 1.0 mg/dL    GFR Non-African American >60 >=60 mL/min/1.73    GFR African American >60     Calcium 9.1 8.6 - 10.2 mg/dL    Total Protein 7.0 6.4 - 8.3 g/dL    Albumin 4.3 3.5 - 5.2 g/dL    Total Bilirubin 0.9 0.0 - 1.2 mg/dL    Alkaline Phosphatase 72 35 - 104 U/L    ALT 17 0 - 32 U/L    AST 21 0 - 31 U/L   CBC with Auto Differential   Result Value Ref Range    WBC 11.8 (H) 4.5 - 11.5 E9/L    RBC 4.39 3.50 - 5.50 E12/L    Hemoglobin 13.6 11.5 - 15.5 g/dL    Hematocrit 42.2 34.0 - 48.0 %    MCV 96.1 80.0 - 99.9 fL    MCH 31.0 26.0 - 35.0 pg    MCHC 32.2 32.0 - 34.5 %    RDW 13.2 11.5 - 15.0 fL    Platelets 876 387 - 734 E9/L    MPV 10.1 7.0 - 12.0 fL    Neutrophils % 87.4 (H) 43.0 - 80.0 %    Immature Granulocytes % 0.5 0.0 - 5.0 %    Lymphocytes % 6.3 (L) 20.0 - 42.0 %    Monocytes % 5.4 2.0 - 12.0 %    Eosinophils % 0.1 0.0 - 6.0 %    Basophils % 0.3 0.0 - 2.0 %    Neutrophils Absolute 10.33 (H) 1.80 - 7.30 E9/L    Immature Granulocytes # 0.06 E9/L    Lymphocytes Absolute 0.74 (L) 1.50 - 4.00 E9/L    Monocytes Absolute 0.64 0.10 - 0.95 E9/L    Eosinophils Absolute 0.01 (L) 0.05 - 0.50 E9/L    Basophils Absolute 0.03 0.00 - 0.20 E9/L   Protime-INR   Result Value Ref Range    Protime 24.1 (H) 9.3 - 12.4 sec    INR 2.2    EKG 12 Lead   Result Value Ref Range    Ventricular Rate 152 BPM    Atrial Rate 304 BPM    QRS Duration 98 ms    Q-T Interval 298 ms    QTc Calculation (Bazett) 473 ms    R Axis -48 degrees    T Axis 123 degrees       RADIOLOGY:  Interpreted by Radiologist.  XR CHEST PORTABLE   Final Result   No acute cardiopulmonary process. XR FEMUR LEFT (MIN 2 VIEWS)   Final Result   Acute displaced the fracture of the left femoral neck above the   intertrochanteric line         XR HIP 2-3 VW W PELVIS LEFT   Final Result   Acute displaced the subcapital fracture of the proximal left femur above the   intertrochanteric line. EKG:  This EKG is signed and interpreted by the EP. EKG shows a flutter with a 2-1 AV conduction 152 bpm.  Nonspecific ST-T wave changes. Normal QRS. No STEMI.  ------------------------- NURSING NOTES AND VITALS REVIEWED ---------------------------   The nursing notes within the ED encounter and vital signs as below have been reviewed by myself. /78   Pulse 80   Temp 98.6 °F (37 °C) (Oral)   Resp 16   Ht 5' 6\" (1.676 m)   Wt 200 lb (90.7 kg)   SpO2 93%   BMI 32.28 kg/m²   Oxygen Saturation Interpretation: Normal    The patients available past medical records and past encounters were reviewed.         ------------------------------ ED COURSE/MEDICAL DECISION MAKING----------------------  Medications   buPROPion [facility] SR) extended release tablet 200 mg (200 mg Oral Not Given 6/23/22 0947)   dilTIAZem (CARDIZEM CD) extended release capsule 180 mg (180 mg Oral Not Given 6/23/22 0946)   sertraline (ZOLOFT) tablet 100 mg (has no administration in time range)   sodium chloride flush 0.9 % injection 10 mL (10 mLs IntraVENous Given 6/23/22 0900)   sodium chloride flush 0.9 % injection 10 mL (has no administration in time range)   0.9 % sodium chloride infusion (has no administration in time range)   promethazine (PHENERGAN) tablet 12.5 mg ( Oral See Alternative 6/23/22 0536)     Or   ondansetron (ZOFRAN) injection 4 mg (4 mg IntraVENous Given 6/23/22 0536)   polyethylene glycol (GLYCOLAX) packet 17 g (has no administration in time range)   acetaminophen (TYLENOL) tablet 650 mg (has no administration in time range)     Or   acetaminophen (TYLENOL) suppository 650 mg (has no administration in time range)   0.9 % sodium chloride infusion ( IntraVENous New Bag 6/23/22 0534)   oxyCODONE-acetaminophen (PERCOCET) 5-325 MG per tablet 1 tablet (1 tablet Oral Given 6/23/22 0910)   dilTIAZem injection 10 mg (10 mg IntraVENous Given 6/23/22 0135)     Followed by   dilTIAZem 100 mg in dextrose 5 % 100 mL infusion ([facility]) (2.5 mg/hr IntraVENous Rate/Dose Change 6/23/22 1524)   morphine (PF) injection 2 mg (2 mg IntraVENous Given 6/23/22 1521)   dilTIAZem injection 10 mg (10 mg IntraVENous Given 6/23/22 0035)   digoxin (LANOXIN) injection 500 mcg (500 mcg IntraVENous Given 6/23/22 1243)   tranexamic acid (CYKLOKAPRON) 1,000 mg in sodium chloride 0.9 % 100 mL IVPB (1,000 mg IntraVENous New Bag 6/23/22 1704)         ED COURSE:       Medical Decision Making:    Is an 81-year-old female presented to the ED after a fall. Patient declined laboratory work-up and imaging. Labs showed a normal CBC. Normal chemistry. INR is therapeutic at 2.2. Troponins were 17 and 1. EKG did show A. fib with RVR. X-ray acute displaced fracture of the left neck. Patient neurovascular intact. Orthopedic surgery consulted. Patient given Cardizem x2 with no improvement therefore Cardizem drip started. Patient admitted to Dr. Raven Freeman for further care.     Critical care: 46 john    I, Dr. Consuello Holstein, am the primary provider for this encounter    This patient's ED course included: a personal history and physicial examination, re-evaluation prior to disposition, multiple bedside re-evaluations, IV medications, cardiac monitoring and continuous pulse oximetry    This patient has remained hemodynamically stable during their ED course. Re-Evaluations:             Re-evaluation. Patients symptoms are improving      Counseling: The emergency provider has spoken with the patient and discussed todays results, in addition to providing specific details for the plan of care and counseling regarding the diagnosis and prognosis. Questions are answered at this time and they are agreeable with the plan.       --------------------------------- IMPRESSION AND DISPOSITION ---------------------------------    IMPRESSION  1. Fall, initial encounter    2. Left displaced femoral neck fracture ([address]    3. Atrial fibrillation with RVR ([facility])        DISPOSITION  Disposition: Admit to telemetry  Patient condition is stable    NOTE: This report was transcribed using voice recognition software.  Every effort was made to ensure accuracy; however, inadvertent computerized transcription errors may be present        José Miguel Demarco DO  06/23/22 0555

## 2022-06-23 NOTE — CONSULTS
Department of Orthopedic Surgery  Resident Consult Note          Reason for Consult:  Left hip pain     HISTORY OF PRESENT ILLNESS:       Patient is a 82 y.o. female who presents with complaint of left hip pain following mechanical fall earlier today. After the fall she had immediate left hip pain and was unable to ambulate. She does not complain of any pain to any other extremities. She denies any changes in sensation. She has history of right total knee arthroplasty which was completed by surgeon in [place], [place]. On arrival to the ED she was found to be in atrial fibrillation with RVR. She has a history of atrial fibrillation and is on Coumadin. She ambulates with a cane. Past Medical History:        Diagnosis Date    Atrial fibrillation [facility])      Past Surgical History:    History reviewed. No pertinent surgical history.   Current Medications:   Current Facility-Administered Medications: buPROPion [facility] SR) extended release tablet 200 mg, 200 mg, Oral, Daily  dilTIAZem (CARDIZEM CD) extended release capsule 180 mg, 180 mg, Oral, BID  sertraline (ZOLOFT) tablet 100 mg, 100 mg, Oral, Daily  sodium chloride flush 0.9 % injection 10 mL, 10 mL, IntraVENous, 2 times per day  sodium chloride flush 0.9 % injection 10 mL, 10 mL, IntraVENous, PRN  0.9 % sodium chloride infusion, , IntraVENous, PRN  promethazine (PHENERGAN) tablet 12.5 mg, 12.5 mg, Oral, Q6H PRN **OR** ondansetron (ZOFRAN) injection 4 mg, 4 mg, IntraVENous, Q6H PRN  polyethylene glycol (GLYCOLAX) packet 17 g, 17 g, Oral, Daily PRN  acetaminophen (TYLENOL) tablet 650 mg, 650 mg, Oral, Q6H PRN **OR** acetaminophen (TYLENOL) suppository 650 mg, 650 mg, Rectal, Q6H PRN  0.9 % sodium chloride infusion, , IntraVENous, Continuous  oxyCODONE-acetaminophen (PERCOCET) 5-325 MG per tablet 1 tablet, 1 tablet, Oral, Q6H PRN  fentaNYL (SUBLIMAZE) injection 25 mcg, 25 mcg, IntraVENous, Q1H PRN  [COMPLETED] dilTIAZem injection 10 mg, 10 mg, IntraVENous, Once **FOLLOWED BY** dilTIAZem 100 mg in dextrose 5 % 100 mL infusion ([facility]), 2.5-15 mg/hr, IntraVENous, Continuous  Allergies:  Patient has no known allergies. Social History:   TOBACCO:   reports that she has never smoked. She has never used smokeless tobacco.  ETOH:   reports previous alcohol use. DRUGS:   reports no history of drug use. ACTIVITIES OF DAILY LIVING: Ambulates with cane  OCCUPATION: Retired  Family History:   History reviewed. No pertinent family history.     REVIEW OF SYSTEMS:  CONSTITUTIONAL:  negative for  fevers, chills  EYES:  negative for blurred vision, visual disturbance  HEENT:  negative for  hearing loss, voice change  RESPIRATORY:  negative for  dyspnea, wheezing  CARDIOVASCULAR:  negative for  chest pain, palpitations  GASTROINTESTINAL:  negative for nausea, vomiting  GENITOURINARY:  negative for frequency, urinary incontinence  HEMATOLOGIC/LYMPHATIC:  negative for bleeding and petechiae  MUSCULOSKELETAL: See HPI  NEUROLOGICAL:  negative for headaches, dizziness  BEHAVIOR/PSYCH:  negative for increased agitation and anxiety    PHYSICAL EXAM:    VITALS:  BP (!) 145/117   Pulse (!) 123   Temp 98.7 °F (37.1 °C)   Resp 21   Ht 5' 6\" (1.676 m)   Wt 200 lb (90.7 kg)   SpO2 94%   BMI 32.28 kg/m²   CONSTITUTIONAL: Hard of hearing, oriented to person and place, cooperative with exam  MUSCULOSKELETAL:  Left lower Extremity:  · Externally rotated left lower extremity with shortening relative to contralateral side  · Pain with logroll, pain with axial loading, no straight leg raise secondary to pain  · Skin intact circumferentially about the extremity, no significant tenderness to palpation about the knee, ankle, or foot  · Sensation grossly intact to the foot in sural, saphenous, tibial, deep peroneal, and superficial peroneal nerve distributions  · Motor function intact to EHL, FHL, gastrocsoleus complex, tibialis anterior  · Dorsalis pedis and posterior tibial pulses are palpable, +2/4  · Compartments soft and compressible    Secondary Exam:   · bilateralUE: No obvious signs of trauma. -TTP to fingers, hand, wrist, forearm, elbow, humerus, shoulder or clavicle. -- Patient able to flex/extend fingers, wrist, elbow and shoulder with active and passive ROM without pain, +2/4 Radial pulse, cap refill <3sec, +AIN/PIN/Radial/Ulnar/Median N, distal sensation grossly intact to C4-T1 dermatomes, compartments soft and compressible. · rightLE: No obvious signs of trauma. -TTP to foot, ankle, leg, knee, thigh, hip.-- Patient able to flex/extend toes, ankle, knee and hip with active and passive ROM without pain,+2/4 DP & PT pulses, cap refill <3sec, +5/5 PF/DF/EHL, distal sensation grossly intact to L4-S1 dermatomes, compartments soft and compressible. · Pelvis: -TTP, -Log roll, -Heel strike     DATA:    CBC:   Lab Results   Component Value Date    WBC 10.0 06/22/2022    RBC 4.25 06/22/2022    HGB 13.3 06/22/2022    HCT 40.8 06/22/2022    MCV 96.0 06/22/2022    MCH 31.3 06/22/2022    MCHC 32.6 06/22/2022    RDW 13.2 06/22/2022     06/22/2022    MPV 9.9 06/22/2022     PT/INR:    Lab Results   Component Value Date    PROTIME 23.9 06/22/2022    INR 2.2 06/22/2022       Radiology Review:  X-ray imaging of the left hip and femur reviewed. This demonstrate a transcervical femoral neck fracture. There is complete displacement with shortening into varus. IMPRESSION:  · Closed, left femoral neck fracture    PLAN:  · Physical exam and imaging findings were reviewed with the patient. This injury will require surgical treatment. · Nonweightbearing to left lower extremity  · Hold anticoagulation pending surgical treatment  · Appreciate medical evaluation.   Appreciate cardiac evaluation prior to surgery  · Pain medications per admitting service  · Treatment consent  · Plan for operative fixation following evaluation by cardiology  · Discuss with attending

## 2022-06-23 NOTE — ANESTHESIA PRE PROCEDURE
Department of Anesthesiology  Preprocedure Note       Name:  Uma Olea   Age:  82 y.o.  :  1935                                          MRN:  [id number]         Date:  2022      Surgeon: Shirley Cortes):  Karlee Simmons MD    Procedure: Procedure(s):  HIP HEMIARTHROPLASTY    Medications prior to admission:   Prior to Admission medications    Medication Sig Start Date End Date Taking?  Authorizing Provider   folic acid (FOLVITE) 141 MCG tablet Take 800 mcg by mouth daily   Yes Historical Provider, MD   calcium carbonate (OSCAL) 500 MG TABS tablet Take 200 mg by mouth daily   Yes Historical Provider, MD   meclizine (ANTIVERT) 25 MG tablet Take 25 mg by mouth 3 times daily as needed for Dizziness   Yes Historical Provider, MD   buPROPion (WELLBUTRIN SR) 200 MG extended release tablet Take 200 mg by mouth daily 22   Historical Provider, MD   dilTIAZem (CARDIZEM CD) 180 MG extended release capsule Take 180 mg by mouth 2 times daily 22   Historical Provider, MD   sertraline (ZOLOFT) 100 MG tablet Take 100 mg by mouth daily    Historical Provider, MD   warfarin (COUMADIN) 5 MG tablet Take 5 mg by mouth four times a week , Tuesday, Thursday, 22   Historical Provider, MD   warfarin (COUMADIN) 2.5 MG tablet Take 2.5 mg by mouth three times a week Monday, Wednesday, Friday    Historical Provider, MD       Current medications:    Current Facility-Administered Medications   Medication Dose Route Frequency Provider Last Rate Last Admin    buPROPion [facility] SR) extended release tablet 200 mg  200 mg Oral Daily Payton Cedillo DO        dilTIAZem (CARDIZEM CD) extended release capsule 180 mg  180 mg Oral BID Payton Cedillo DO        sertraline (ZOLOFT) tablet 100 mg  100 mg Oral Daily Payton Cedillo DO        sodium chloride flush 0.9 % injection 10 mL  10 mL IntraVENous 2 times per day Payton Cedillo DO   10 mL at 22 0900    sodium chloride flush 0.9 % injection 10 mL  10 mL IntraVENous PRN Luz Maria Jay, DO        0.9 % sodium chloride infusion   IntraVENous PRN Luz Maria Jay, DO        promethazine (PHENERGAN) tablet 12.5 mg  12.5 mg Oral Q6H PRN Luz Maria Jay, DO        Or    [facility] injection 4 mg  4 mg IntraVENous Q6H PRN Luz Maria Jay, DO   4 mg at 06/23/22 0536    polyethylene glycol (GLYCOLAX) packet 17 g  17 g Oral Daily PRN Luz Maria Jay, DO        acetaminophen (TYLENOL) tablet 650 mg  650 mg Oral Q6H PRN Luz Maria Jay, DO        Or    acetaminophen (TYLENOL) suppository 650 mg  650 mg Rectal Q6H PRN Luz Maria Jay, DO        0.9 % sodium chloride infusion   IntraVENous Continuous Luz Maria Jay, DO 75 mL/hr at 06/23/22 0534 New Bag at 06/23/22 0534    oxyCODONE-acetaminophen (PERCOCET) 5-325 MG per tablet 1 tablet  1 tablet Oral Q6H PRN Luz Maria Jay, DO   1 tablet at 06/23/22 0910    dilTIAZem 100 mg in dextrose 5 % 100 mL infusion ([facility] [place])  2.5-15 mg/hr IntraVENous Continuous Gaston Saran, DO 5 mL/hr at 06/23/22 0556 5 mg/hr at 06/23/22 0556    morphine (PF) injection 2 mg  2 mg IntraVENous Q2H PRN Deann Rm MD   2 mg at 06/23/22 1056       Allergies:  No Known Allergies    Problem List:    Patient Active Problem List   Diagnosis Code    Atrial fibrillation with RVR [facility]) I48.91    Fall W19. Janelle Rodriguez       Past Medical History:        Diagnosis Date    Atrial fibrillation [facility])        Past Surgical History:  History reviewed. No pertinent surgical history.     Social History:    Social History     Tobacco Use    Smoking status: Never Smoker    Smokeless tobacco: Never Used   Substance Use Topics    Alcohol use: Not Currently                                Counseling given: Not Answered      Vital Signs (Current):   Vitals:    06/23/22 0650 06/23/22 0823 06/23/22 0940 06/23/22 1126   BP: (!) 118/94 (!) 113/98     Pulse: (!) 104 (!) 104     Resp: 24 20 22 18   Temp: 36.8 °C (98.2 °F) 36.5 °C (97.7 °F)     TempSrc: Oral Oral     SpO2: 96% 95%     Weight:       Height:                                                  BP Readings from Last 3 Encounters:   06/23/22 (!) 113/98       NPO Status:                                                                                 BMI:   Wt Readings from Last 3 Encounters:   06/22/22 200 lb (90.7 kg)     Body mass index is 32.28 kg/m². CBC:   Lab Results   Component Value Date    WBC 11.8 06/23/2022    RBC 4.39 06/23/2022    HGB 13.6 06/23/2022    HCT 42.2 06/23/2022    MCV 96.1 06/23/2022    RDW 13.2 06/23/2022     06/23/2022       CMP:   Lab Results   Component Value Date     06/23/2022    K 4.2 06/23/2022     06/23/2022    CO2 23 06/23/2022    BUN 15 06/23/2022    CREATININE 0.8 06/23/2022    GFRAA >60 06/23/2022    LABGLOM >60 06/23/2022    GLUCOSE 172 06/23/2022    PROT 7.0 06/23/2022    CALCIUM 9.1 06/23/2022    BILITOT 0.9 06/23/2022    ALKPHOS 72 06/23/2022    AST 21 06/23/2022    ALT 17 06/23/2022       POC Tests: No results for input(s): POCGLU, POCNA, POCK, POCCL, POCBUN, POCHEMO, POCHCT in the last 72 hours. Coags:   Lab Results   Component Value Date    PROTIME 24.1 06/23/2022    INR 2.2 06/23/2022    APTT 35.5 06/22/2022     EKG - 6/23/22-  Atrial flutter with 2:1 AV conduction  Left anterior fascicular block  ST & T wave abnormality, consider lateral ischemia  Abnormal ECG    HCG (If Applicable): No results found for: PREGTESTUR, PREGSERUM, HCG, HCGQUANT     ABGs: No results found for: PHART, PO2ART, AJK3QAE, QIT7FBC, BEART, L9EHITBH     Type & Screen (If Applicable):  No results found for: LABABO, LABRH    Drug/Infectious Status (If Applicable):  No results found for: HIV, HEPCAB    COVID-19 Screening (If Applicable): No results found for: COVID19        Anesthesia Evaluation  Patient summary reviewed and Nursing notes reviewed   history of anesthetic complications: PONV.   Airway: Mallampati: III  TM distance: >3 FB   Neck ROM: full  Mouth opening: < 3 FB   Dental: Pulmonary:Negative Pulmonary ROS and normal exam  breath sounds clear to auscultation      (-) not a current smoker                           Cardiovascular:  Exercise tolerance: good (>4 METS),   (+) hypertension: mild, dysrhythmias: atrial fibrillation,       ECG reviewed  Rhythm: irregular             Beta Blocker:  Not on Beta Blocker         Neuro/Psych:   (+) depression/anxiety             GI/Hepatic/Renal: Neg GI/Hepatic/Renal ROS            Endo/Other:              Pt had no PAT visit       Abdominal:             Vascular: negative vascular ROS. Other Findings:           Anesthesia Plan      general and regional     ASA 3     (#22 left a/c  Pt agreeable to plan for anesthesia. )  Induction: intravenous. BIS  MIPS: Postoperative opioids intended and Prophylactic antiemetics administered. Anesthetic plan and risks discussed with patient. Use of blood products discussed with patient whom consented to blood products. Plan discussed with CRNA and attending.                     Mary Ellen Schmidt RN   6/23/2022

## 2022-06-23 NOTE — H&P
Hospitalist History & Physical      PCP: No primary care provider on file. Date of Service: Pt seen/examined on 6/23/2022     Chief Complaint:  had concerns including Hip Pain ((fell tonight on R hip, +rotation, -LOC, -hit head, +thinners) 50mcg FENTANYL AND 4MG ZOFRAN GIVEN pta BY ems AT 23:50). History Of Present Illness:    Ms. Dereck Jennings, a 82y.o. year old female  who  has a past medical history of Atrial fibrillation ([facility] [address] Patient presented to the emergency department after a fall. Patient was outside on a swing when she got up to walk back to the house and she tripped falling onto her left side. There is no injury to her head and no loss of consciousness. She fell on her left hip. Now complaining of left hip pain. Denies any chest pain, shortness of breath, fever, chills, nausea, vomiting. Patient's vital signs reveal tachycardia with a rate of 150. EKG showed atrial fibrillation with RVR. Remainder vital signs within normal limits and stable. Patient has a history of atrial fibrillation. Was given diltiazem in the emergency department. Rates currently in the 120s. Patient is chronically anticoagulated on Coumadin. Laboratory studies show an INR of 2.2. X-rays show acute displaced fracture of the left femoral neck. Orthopedics was consulted. Medicine consulted for admission. Past Medical History:   Diagnosis Date    Atrial fibrillation [facility])        History reviewed. No pertinent surgical history. Prior to Admission medications    Medication Sig Start Date End Date Taking?  Authorizing Provider   folic acid (FOLVITE) 152 MCG tablet Take 800 mcg by mouth daily   Yes Historical Provider, MD   calcium carbonate (OSCAL) 500 MG TABS tablet Take 200 mg by mouth daily   Yes Historical Provider, MD   meclizine (ANTIVERT) 25 MG tablet Take 25 mg by mouth 3 times daily as needed for Dizziness   Yes Historical Provider, MD   buPROPion (WELLBUTRIN SR) 200 MG extended release tablet Take 200 mg by mouth daily 6/11/22   Historical Provider, MD   dilTIAZem (CARDIZEM CD) 180 MG extended release capsule Take 180 mg by mouth 2 times daily 6/16/22   Historical Provider, MD   sertraline (ZOLOFT) 100 MG tablet Take 100 mg by mouth daily    Historical Provider, MD   warfarin (COUMADIN) 5 MG tablet Take 5 mg by mouth four times a week Sunday, Tuesday, Thursday, Saturday 5/24/22   Historical Provider, MD   warfarin (COUMADIN) 2.5 MG tablet Take 2.5 mg by mouth three times a week Monday, Wednesday, Friday    Historical Provider, MD         Allergies:  Patient has no known allergies. Social History:    TOBACCO:   reports that she has never smoked. She has never used smokeless tobacco.  ETOH:   reports previous alcohol use. Family History:    Reviewed in detail and negative for DM, CAD, Cancer, CVA. Positive as follows\"  History reviewed. No pertinent family history. REVIEW OF SYSTEMS:   Pertinent positives as noted in the HPI. All other systems reviewed and negative. PHYSICAL EXAM:  BP (!) 145/117   Pulse (!) 123   Temp 98.7 °F (37.1 °C)   Resp 21   Ht 5' 6\" (1.676 m)   Wt 200 lb (90.7 kg)   SpO2 94%   BMI 32.28 kg/m²   General appearance: No apparent distress, appears stated age and cooperative. HEENT: Normal cephalic, atraumatic without obvious deformity. Pupils equal, round, and reactive to light. Extra ocular muscles intact. Conjunctivae/corneas clear. Neck: Supple, with full range of motion. No jugular venous distention. Trachea midline. Respiratory: Clear to auscultation bilaterally  Cardiovascular: Tachycardic, irregularly irregular  Abdomen: Soft, nontender, nondistended  Musculoskeletal: No clubbing, cyanosis, edema of bilateral lower extremities. Brisk capillary refill. Left leg shortened and externally rotated  Skin: Normal skin color. No rashes or lesions. Neurologic:  Neurovascularly intact without any focal sensory/motor deficits.  Cranial nerves: II-XII intact, grossly non-focal.    Reviewed EKG and CXR personally      CBC:   Recent Labs     06/22/22 2353   WBC 10.0   RBC 4.25   HGB 13.3   HCT 40.8   MCV 96.0   RDW 13.2        BMP:   Recent Labs     06/22/22 2353      K 4.3      CO2 25   BUN 18   CREATININE 0.9     LFT:  No results for input(s): PROT, ALB, ALKPHOS, ALT, AST, BILITOT, AMYLASE, LIPASE in the last 72 hours. CE:  No results for input(s): Doug Beery in the last 72 hours. PT/INR:   Recent Labs     06/22/22 2353   INR 2.2   APTT 35.5*     BNP: No results for input(s): BNP in the last 72 hours. ESR: No results found for: SEDRATE  CRP: No results found for: CRP  D Dimer: No results found for: DDIMER   Folate and B12: No results found for: [id number], No results found for: FOLATE  Lactic Acid: No results found for: LACTA  Thyroid Studies: No results found for: TSH, F6WIMQX, W1IOXEW, THYROIDAB    Oupatient labs:  Lab Results   Component Value Date    INR 2.2 06/22/2022       Urinalysis:  No results found for: NITRU, WBCUA, BACTERIA, RBCUA, BLOODU, SPECGRAV, GLUCOSEU    Imaging:  XR FEMUR LEFT (MIN 2 VIEWS)    Result Date: 6/23/2022  EXAMINATION: XRAY VIEWS OF THE LEFT FEMUR 6/23/2022 12:43 am COMPARISON: None. HISTORY: ORDERING SYSTEM PROVIDED HISTORY: fall TECHNOLOGIST PROVIDED HISTORY: Reason for exam:->fall What reading provider will be dictating this exam?->CRC FINDINGS: Presence of acute displaced fracture of the left femoral neck above the intertrochanteric line. No acute fracture seen in the left femoral shaft. The left knee joint is partially covered on this study there are degenerative changes in the left knee joint with narrowing of the joint space predominant in the medial compartment. There is no left knee joint effusion.   .     Acute displaced the fracture of the left femoral neck above the intertrochanteric line     XR CHEST PORTABLE    Result Date: 6/23/2022  EXAMINATION: ONE XRAY VIEW OF THE CHEST 6/23/2022 12:44 am COMPARISON: None. HISTORY: ORDERING SYSTEM PROVIDED HISTORY: fall TECHNOLOGIST PROVIDED HISTORY: Reason for exam:->fall What reading provider will be dictating this exam?->CRC FINDINGS: The heart appears to be with borderline size. Slight prominence of perihilar vessels are seen. No sizable confluent consolidations or infiltrates in the lung parenchyma. There is ectasia tortuous of the thoracic aorta. There is no signs for subcutaneous emphysema, pneumomediastinum or pneumothorax. The outlines of the heart and mediastinum preserved. No acute cardiopulmonary process. XR HIP 2-3 VW W PELVIS LEFT    Result Date: 6/23/2022  EXAMINATION: ONE XRAY VIEW OF THE PELVIS AND TWO XRAY VIEWS LEFT HIP 6/23/2022 12:43 am COMPARISON: None. HISTORY: ORDERING SYSTEM PROVIDED HISTORY: fall TECHNOLOGIST PROVIDED HISTORY: Reason for exam:->fall What reading provider will be dictating this exam?->CRC FINDINGS: Presence of acute the displaced subcapital fracture proximal left femur above the intertrochanteric line. The left femoral head is still in proper position in the left acetabulum. There is a superolateral migration of the proximal left femur which abuts against the left femoral head fragment. No acute fractures in the pelvic bones. No acute fractures in the right hip. Acute displaced the subcapital fracture of the proximal left femur above the intertrochanteric line.        ASSESSMENT:  -Atrial fibrillation with RVR  -Left femoral neck fracture  -Fall at home  -Chronically anticoagulated on Coumadin      PLAN:  -Admit to medicine  -Consult orthopedic surgery  -Consult cardiology for preoperative evaluation/clearance  -Telemetry  -Pain management  -Hold Coumadin and anticoagulants        Diet: No diet orders on file  Code Status: No Order  Surrogate decision maker confirmed with patient:   Extended Emergency Contact Information  Primary Emergency Contact: [address] Phone: [phone]  Relation: Child  Preferred language: English   needed? No    DVT Prophylaxis: []Lovenox []Heparin []PCD [] [address] []Encouraged ambulation  Disposition: []Med/Surg [] Intermediate [] ICU/CCU  Admit status: [] Observation [] Inpatient     +++++++++++++++++++++++++++++++++++++++++++++++++  Letitia Quivers, DO  +++++++++++++++++++++++++++++++++++++++++++++++++  NOTE: This report was transcribed using voice recognition software. Every effort was made to ensure accuracy; however, inadvertent computerized transcription errors may be present.

## 2022-06-23 NOTE — PROGRESS NOTES
Dr. Saleem Rossi states that Pt is OK for surgery from their perspective as long at PT/ INR comes back WDL and that he will adjust patient's cardiac medications.

## 2022-06-23 NOTE — PROGRESS NOTES
With help of pcapulled pt from stretcher to hospital bed. Pt tolerated lift well. But unable to turn to assist with removing excess blankets. Pt has pain when HOB elevated. Vitals obtained. Iv fluids and cardizem infusing. Pt alert and answering questions but groggy. Pt received on 2L. Purse, blanket, and pillow brought w pt.

## 2022-06-24 PROCEDURE — 2700000000 HC OXYGEN THERAPY PER DAY

## 2022-06-24 PROCEDURE — 2580000003 HC RX 258

## 2022-06-24 PROCEDURE — 2140000000 HC CCU INTERMEDIATE R&B

## 2022-06-24 PROCEDURE — 6360000002 HC RX W HCPCS

## 2022-06-24 PROCEDURE — 97161 PT EVAL LOW COMPLEX 20 MIN: CPT

## 2022-06-24 PROCEDURE — 97530 THERAPEUTIC ACTIVITIES: CPT

## 2022-06-24 PROCEDURE — 0BH18EZ INSERTION OF ENDOTRACHEAL AIRWAY INTO TRACHEA, VIA NATURAL OR ARTIFICIAL OPENING ENDOSCOPIC: ICD-10-PCS | Performed by: INTERNAL MEDICINE

## 2022-06-24 PROCEDURE — 6370000000 HC RX 637 (ALT 250 FOR IP)

## 2022-06-24 PROCEDURE — 6360000002 HC RX W HCPCS: Performed by: INTERNAL MEDICINE

## 2022-06-24 PROCEDURE — 97535 SELF CARE MNGMENT TRAINING: CPT

## 2022-06-24 PROCEDURE — 2500000003 HC RX 250 WO HCPCS

## 2022-06-24 PROCEDURE — 97165 OT EVAL LOW COMPLEX 30 MIN: CPT

## 2022-06-24 PROCEDURE — 99233 SBSQ HOSP IP/OBS HIGH 50: CPT | Performed by: INTERNAL MEDICINE

## 2022-06-24 RX ORDER — DIGOXIN 0.25 MG/ML
500 INJECTION INTRAMUSCULAR; INTRAVENOUS ONCE
Status: COMPLETED | OUTPATIENT
Start: 2022-06-24 | End: 2022-06-24

## 2022-06-24 RX ADMIN — DILTIAZEM HYDROCHLORIDE 180 MG: 180 CAPSULE, EXTENDED RELEASE ORAL at 09:24

## 2022-06-24 RX ADMIN — DEXTROSE MONOHYDRATE 5 MG/HR: 50 INJECTION, SOLUTION INTRAVENOUS at 03:48

## 2022-06-24 RX ADMIN — Medication 10 ML: at 09:25

## 2022-06-24 RX ADMIN — DILTIAZEM HYDROCHLORIDE 180 MG: 180 CAPSULE, EXTENDED RELEASE ORAL at 22:05

## 2022-06-24 RX ADMIN — BUPROPION HYDROCHLORIDE 200 MG: 100 TABLET, EXTENDED RELEASE ORAL at 09:24

## 2022-06-24 RX ADMIN — MORPHINE SULFATE 2 MG: 2 INJECTION, SOLUTION INTRAMUSCULAR; INTRAVENOUS at 09:24

## 2022-06-24 RX ADMIN — Medication 10 ML: at 22:07

## 2022-06-24 RX ADMIN — DIGOXIN 500 MCG: 0.25 INJECTION INTRAMUSCULAR; INTRAVENOUS at 12:58

## 2022-06-24 RX ADMIN — OXYCODONE AND ACETAMINOPHEN 1 TABLET: 5; 325 TABLET ORAL at 15:55

## 2022-06-24 RX ADMIN — WATER 1000 MG: 1 INJECTION INTRAMUSCULAR; INTRAVENOUS; SUBCUTANEOUS at 12:58

## 2022-06-24 RX ADMIN — SERTRALINE 100 MG: 100 TABLET, FILM COATED ORAL at 22:06

## 2022-06-24 RX ADMIN — WATER 1000 MG: 1 INJECTION INTRAMUSCULAR; INTRAVENOUS; SUBCUTANEOUS at 03:46

## 2022-06-24 ASSESSMENT — PAIN DESCRIPTION - ORIENTATION: ORIENTATION: LEFT

## 2022-06-24 ASSESSMENT — PAIN SCALES - GENERAL
PAINLEVEL_OUTOF10: 4
PAINLEVEL_OUTOF10: 0

## 2022-06-24 ASSESSMENT — PAIN DESCRIPTION - DESCRIPTORS: DESCRIPTORS: ACHING

## 2022-06-24 ASSESSMENT — PAIN DESCRIPTION - LOCATION: LOCATION: HIP

## 2022-06-24 NOTE — PROGRESS NOTES
Physical Therapy  Physical Therapy Initial Assessment     Name: Jawsant Maldonado  : 1935  MRN: [id number]      Date of Service: 2022    Evaluating PT:  Alejandra Solorio PT, DPT [id number]    Room #:  3340/5455-C  Diagnosis:  Atrial fibrillation with RVR ([facility] [address] [I48.91]  Left displaced femoral neck fracture ([facility] [address] Yoni Stein  Fall, initial encounter [W19. XXXA]  PMHx/PSHx:  A fib  Procedure/Surgery:  L hip hemiarthroplasty   Precautions:  Falls, posterior hip precautions, WBAT LLE, O2  Equipment Needs:  TBD pending pt progress    SUBJECTIVE:    Pt lives with  in a 1 story apartment (independent living) with level entry. Bed is on 1st floor and bath is on 1st floor. Pt ambulated with rollator in community and no AD in apartment PTA. OBJECTIVE:   Initial Evaluation  Date: 22 Treatment Short Term/ Long Term   Goals   AM-PAC 6 Clicks 83/42     Was pt agreeable to Eval/treatment? yes     Does pt have pain? 3/10 after activity in L hip     Bed Mobility  Rolling: Reina  Supine to sit: Reina  Sit to supine: Reina  Scooting: Reina  Rolling: Independent   Supine to sit: Independent   Sit to supine: Independent   Scooting: Independent    Transfers Sit to stand: Reina  Stand to sit: Reina  Stand pivot: Reina with [employer]  Sit to stand: Independent   Stand to sit: Independent   Stand pivot: Mod I with [employer]   Ambulation    30 feet with [employer] Reina  >150 feet with [employer] mod I   Stair negotiation: ascended and descended  NT  TBD   ROM BUE:  Defer to OT note  BLE:  RLE WFL, LLE NT     Strength BUE:  Defer to OT note  BLE:  RLE grossly 4/5, LLE NT  WNL   Balance Sitting EOB:  SBA  Dynamic Standing:  Reina with [employer]  Sitting EOB:  Independent   Dynamic Standing:   Mod I with [employer]     Pt is A & O x 4  Sensation:  Denies abnormalities  Edema:  None noted    Vitals:  SPO2 on 2L throughout: 94-96%    Patient education  Pt educated on role of PT, WBAT LLE, posterior hip precautions, sequencing for step to gait pattern with [employer]    Patient response to education:   Pt verbalized understanding Pt demonstrated skill Pt requires further education in this area   yes yes yes     ASSESSMENT:    Conditions Requiring Skilled Therapeutic Intervention:    [x]Decreased strength     [x]Decreased ROM  [x]Decreased functional mobility  [x]Decreased balance   [x]Decreased endurance   []Decreased posture  []Decreased sensation  []Decreased coordination   []Decreased vision  []Decreased safety awareness   []Increased pain       Comments:  Patient semi-supine in bed upon entry and agreeable to PT evaluation with daughter present in room. Pt able to complete bed mobility with light assist to LLE. Pt able to stand and ambulate to [place] with [employer] and consistent cues for sequencing to assist in offloading painful L hip with moderate carry over. Extra time required to cover short distance with [employer] this date. Education also provided on posterior hip precautions with continued reminders throughout. Patient reporting sitting in chair for most of the day and requested to return to bed at end of session d/t fatigue. Assisted LEs for sit>supine. Pt able to scoot self to [facility]. Left in semi-supine with all needs met. Patient to benefit from continued skilled PT at [place] to improve functional mobility and decrease fall risk. Treatment:  Patient practiced and was instructed in the following treatment:     Bed Mobility: VCs provided for sequencing and safety during mobility. Manual assist provided for completion of task.  Transfer Training: Verbal and tactile cueing provided for sequencing and safety during mobility. Manual assist provided for completion of task   Gait Training: Ambulation with [employer] and verbal cues for proper technique and safety.  Manual assist provided for completion of task    Patient Education: Education provided on WBAT LLE, posterior hip precautions, sequencing for step to gait pattern with [employer]    Pt's/ family goals   1. DC to rehabilitation     Prognosis is good for reaching above PT goals. Patient and or family understand(s) diagnosis, prognosis, and plan of care. yes    PHYSICAL THERAPY PLAN OF CARE:    PT POC is established based on physician order and patient diagnosis     Referring provider/PT Order:    06/23/22 2145  PT eval and treat  Start:  06/23/22 2145,   End:  06/23/22 2145,   ONE TIME,   Standing Count:  1 Occurrences,   R         Frank Eagle, DO       Diagnosis:  Atrial fibrillation with RVR ([address] [I48.91]  Left displaced femoral neck fracture ([address] [facility]  Fall, initial encounter [W19. XXXA]  Specific instructions for next treatment:  Progress mobility as appropriate     Current Treatment Recommendations:     [x] Strengthening to improve independence with functional mobility   [x] ROM to improve independence with functional mobility   [x] Balance Training to improve static/dynamic balance and to reduce fall risk  [x] Endurance Training to improve activity tolerance during functional mobility   [x] Transfer Training to improve safety and independence with all functional transfers   [x] Gait Training to improve gait mechanics, endurance and assess need for appropriate assistive device  [] Stair Training in preparation for safe discharge home and/or into the community   [x] Positioning to prevent skin breakdown and contractures  [x] Safety and Education Training   [x] Patient/Caregiver Education   [] HEP  [x] Other     PT long term treatment goals are located in above grid    Frequency of treatments: 2-5x/week x 1-2 weeks. Time in  1540  Time out  1625    Total Treatment Time  25 minutes     Evaluation Time includes thorough review of current medical information, gathering information on past medical history/social history and prior level of function, completion of standardized testing/informal observation of tasks, assessment of data and education on plan of care and goals.     CPT codes:  [x] Low Complexity PT evaluation 41835  [] Moderate Complexity PT evaluation [id number]  [] High Complexity PT evaluation [id number]  [] PT Re-evaluation [id number]  [] Gait training 91468 - minutes  [] Manual therapy 74796 - minutes  [x] Therapeutic activities 32053 25 minutes  [] Therapeutic exercises 05416 - minutes  [] Neuromuscular reeducation 49100 - minutes     Blaise Ferguson PT, DPT  [id number]

## 2022-06-24 NOTE — CARE COORDINATION
Patient presented to the ED following a fall and XR showed transcervical femoral neck fracture; admitted for fall and afib RVR. S/p left hip hemiarthroplasty. Met with patient and daughter, Jean Paul Christy at bedside for transition of care planning. Patient lives at [facility] (assisted living facility) in [address] [place] [address] with her . Patient is independent, ambulates without a device in her apartment and uses a rollator when going out. Patient currently on 3L NC (no O2 at baseline). Uses [facility] on [address] in [address] [place] [address] and  PCP is Dr. Andrea Ferris. Discussed rehab and patient needing to get stronger before returning back to [facility]. Choices are [facility] on the [employer] Patient pending PT/OT evals. Call made to [facility] the [facility] to make referral, spoke with Kurt Osman. She reports she may not have a bed available until next week and will need referral faxed to [phone] when PT/OT evals are completed. Praful Rodgers at [facility] to make a referral but no answer; left a message to return the call. Will need ambulance form, 7000 and transport envelope completed. 1:51P  Received a return call from Yesy Maria at [facility], made referral; she will review and follow-up. The Plan for Transition of Care is related to the following treatment goals: discharge planning    The Patient and/or patient representative Jovanny Guerrero was provided with a choice of provider and agrees with the discharge plan. [x] Yes [] No    Freedom of choice list was provided with basic dialogue that supports the patient's individualized plan of care/goals, treatment preferences and shares the quality data associated with the providers.  [x] Yes [] No     Pedro Jeffers, MSW, LSW [phone]

## 2022-06-24 NOTE — ANESTHESIA POSTPROCEDURE EVALUATION
Department of Anesthesiology  Postprocedure Note    Patient: Yolie Levy  MRN: [id number]  YOB: 1935  Date of evaluation: 6/24/2022      Procedure Summary     Date: 06/23/22 Room / Location: Deborrah Favre OR 08 / [facility]    Anesthesia Start: 3196 Anesthesia Stop: 1843    Procedure: HIP HEMIARTHROPLASTY (Left Hip) Diagnosis:       Closed displaced fracture of left femoral neck ([address]      (LEFT FEMORAL NECK FX)    Surgeons: Deadra Hodgkin, MD Responsible Provider: Hyacinth Dawkins DO    Anesthesia Type: general, regional ASA Status: 3          Anesthesia Type: No value filed.     Diana Phase I: Diana Score: 9    Diana Phase II:        Anesthesia Post Evaluation    Patient location during evaluation: PACU  Patient participation: complete - patient participated  Level of consciousness: awake and alert  Airway patency: patent  Nausea & Vomiting: no nausea and no vomiting  Complications: no  Cardiovascular status: blood pressure returned to baseline  Respiratory status: acceptable  Hydration status: euvolemic  Multimodal analgesia pain management approach

## 2022-06-24 NOTE — PROGRESS NOTES
Met with daughter and grand child in [address] before pt returned to floor. Per family request locked pt purse in med room, and daughter took pt house key.

## 2022-06-24 NOTE — CARE COORDINATION
Patient's daughter, Mohsen Judge requested to see SW at bedside. Mohsen Judge states her mother has been evaluated by OT and wants to know when PT is coming to evaluate the patient. Advised the consult is in and will call the department. Call made to therapy department for PT eval but no answer; left a detailed message.      Amira Walls, MSW, LSW [phone]

## 2022-06-24 NOTE — PROGRESS NOTES
Hospitalist Progress Note      SYNOPSIS: Patient admitted on 2022 for     Patient presented to the emergency department after a fall. Patient was outside on a swing when she got up to walk back to the house and she tripped falling onto her left side. There is no injury to her head and no loss of consciousness. She fell on her left hip. Now complaining of left hip pain. Denies any chest pain, shortness of breath, fever, chills, nausea, vomiting. Patient's vital signs reveal tachycardia with a rate of 150. EKG showed atrial fibrillation with RVR. Remainder vital signs within normal limits and stable. Patient has a history of atrial fibrillation. Was given diltiazem in the emergency department. Rates currently in the 120s. Patient is chronically anticoagulated on Coumadin. Laboratory studies show an INR of 2.2. X-rays show acute displaced fracture of the left femoral neck. Orthopedics was consulted. Medicine consulted for admission. Patient underwent left hip hemiarthroplasty on 22. SUBJECTIVE:    Patient seen and examined in her room. Patient states that pain is well controlled. No major overnight events. Records reviewed. Stable overnight. No other overnight issues reported. Temp (24hrs), Av.7 °F (36.5 °C), Min:97 °F (36.1 °C), Max:98.6 °F (37 °C)    DIET: ADULT DIET; Regular  CODE: Full Code    Intake/Output Summary (Last 24 hours) at 2022 0823  Last data filed at 2022 2240  Gross per 24 hour   Intake 1325 ml   Output 625 ml   Net 700 ml       OBJECTIVE:    BP (!) 112/100   Pulse 87   Temp 97.2 °F (36.2 °C) (Oral)   Resp 16   Ht 5' 6\" (1.676 m)   Wt 210 lb 8 oz (95.5 kg)   SpO2 98%   BMI 33.98 kg/m²     General appearance: No apparent distress, appears stated age and cooperative. HEENT:  Conjunctivae/corneas clear. Neck: Supple. No jugular venous distention.    Respiratory: Clear to auscultation bilaterally, normal respiratory effort  Cardiovascular: Regular rate rhythm, normal S1-S2  Abdomen: Soft, nontender, nondistended  Musculoskeletal: No clubbing, cyanosis, no bilateral lower extremity edema. Brisk capillary refill. Skin:  No rashes  on visible skin  Neurologic: awake, alert and following commands       ASSESSMENT:  -Atrial fibrillation with RVR  -Left femoral neck fracture  -Fall at home  -Chronically anticoagulated on Coumadin        PLAN:  -Telemetry  -Pain management  -Hold Coumadin and anticoagulants  Status post left hip hemiarthroplasty on 6/23/2022  A. fib with RVR improving on the morning of 6/24, plan on weaning of Cardizem drip  Will discuss with orthopedic team and cardiology recommended restart date for Eliquis       DISPOSITION:   Possible discharge in the next 24 hours based on PT/OT recommendations. Medications:  REVIEWED DAILY    Infusion Medications    sodium chloride      sodium chloride 75 mL/hr at 06/23/22 1953    dilTIAZem 5 mg/hr (06/24/22 0348)     Scheduled Medications    buPROPion  200 mg Oral Daily    dilTIAZem  180 mg Oral BID    sertraline  100 mg Oral Daily    sodium chloride flush  10 mL IntraVENous 2 times per day    ceFAZolin  1,000 mg IntraVENous Q8H     PRN Meds: sodium chloride flush, sodium chloride, promethazine **OR** ondansetron, polyethylene glycol, acetaminophen **OR** acetaminophen, oxyCODONE-acetaminophen, morphine    Labs:     Recent Labs     06/22/22 2353 06/23/22  0640   WBC 10.0 11.8*   HGB 13.3 13.6   HCT 40.8 42.2    189       Recent Labs     06/22/22  2353 06/23/22  0640    139   K 4.3 4.2    101   CO2 25 23   BUN 18 15   CREATININE 0.9 0.8   CALCIUM 9.2 9.1       Recent Labs     06/23/22  0640   PROT 7.0   ALKPHOS 72   ALT 17   AST 21   BILITOT 0.9       Recent Labs     06/22/22  2353 06/23/22  1121   INR 2.2 2.2       No results for input(s): CKTOTAL, TROPONINI in the last 72 hours.     Chronic labs:    Lab Results   Component Value Date    INR 2.2 06/23/2022       Radiology: REVIEWED DAILY    +++++++++++++++++++++++++++++++++++++++++++++++++  Kat Breaux MD  [facility] Emergency Department Physician - [address] [place] [address]  +++++++++++++++++++++++++++++++++++++++++++++++++  NOTE: This report was transcribed using voice recognition software. Every effort was made to ensure accuracy; however, inadvertent computerized transcription errors may be present.

## 2022-06-24 NOTE — PROGRESS NOTES
Occupational Therapy  OCCUPATIONAL THERAPY INITIAL EVALUATION    [address] [address] [address]      Date:2022                                                Patient Name: Annmarie Tena  MRN: [id number]  : 1935  Room: [address] [place] [address]    Evaluating OT: Haven Journey OTR/L #[id number]     Referring Provider: Clayton Harden DO  Specific Provider Orders/Date: OT eval and treat 22    Diagnosis: Atrial fibrillation with RVR ([facility] [I48.91]  Left displaced femoral neck fracture ([address] Dorothey Garrett  Fall, initial encounter [W19. XXXA]   Pt admitted to hospital following fall    Surgery / Procedure: Left [facility] 2022       Pertinent Medical History:  has a past medical history of Atrial fibrillation ([facility]         Precautions:  Fall Risk, WBAT L LE, posterior hip precautions, O2    Assessment of current deficits    [x] Functional mobility  [x]ADLs  [x] Strength               []Cognition    [x] Functional transfers   [x] IADLs         [x] Safety Awareness   [x]Endurance    [] Fine Coordination              [x] Balance      [] Vision/perception   []Sensation     []Gross Motor Coordination  [] ROM  [] Delirium                   [] Motor Control     OT PLAN OF CARE   OT POC based on physician orders, patient diagnosis and results of clinical assessment    Frequency/Duration 1-3 days/wk for 2 weeks PRN   Specific OT Treatment Interventions to include:   * Instruction/training on adapted ADL techniques and AE recommendations to increase functional independence within precautions       * Training on energy conservation strategies, correct breathing pattern and techniques to improve independence/tolerance for self-care routine  * Functional transfer/mobility training/DME recommendations for increased independence, safety, and fall prevention  * Patient/Family education to increase follow through with safety techniques and functional independence  * Recommendation of environmental modifications for increased safety with functional transfers/mobility and ADLs  * Cognitive retraining/development of therapeutic activities to improve problem solving, judgement, memory, and attention for increased safety/participation in ADL/IADL tasks  * Therapeutic exercise to improve motor endurance, ROM, and functional strength for ADLs/functional transfers  * Therapeutic activities to facilitate/challenge dynamic balance, stand tolerance for increased safety and independence with ADLs  * Therapeutic activities to facilitate gross/fine motor skills for increased independence with ADLs  * Neuro-muscular re-education: facilitation of righting/equilibrium reactions, midline orientation, scapular stability/mobility, normalization of muscle tone, and facilitation of volitional active controled movement    Recommended Adaptive Equipment:  TBD     Home Living: Pt lives with  in a ground level apartment with level entry     Bathroom setup: walk-in shower with seat    Equipment owned: rollator, shower chair    Prior Level of Function: Independent with ADLs , Independent with IADLs; ambulated without AD in apartment and rollator in community    Driving: yes   Occupation: enjoys reading the bible and playing cards    Pain Level: Pt reports 7/10 L LE pain this session with activity;  Therapist facilitated repositioning to address pain , nursing provided pain medication this session    Cognition: A&O: 4/4; Follows 1-2 step directions   Memory:  Fair+   Sequencing:  fair   Problem solving:  Fair-   Judgement/safety:  Fair-   Mild delay in processing      Functional Assessment:  AM-PAC Daily Activity Raw Score: 14/24   Initial Eval Status  Date: 6/24/22 Treatment Status  Date: STGs = LTGs  Time frame: 10-14 days   Feeding Independent      Grooming Minimal Assist   Modified Rice    UB Dressing Minimal Assist   Modified Rice    LB Dressing Dependent   Minimal Assist    Bathing Maximal Assist  Minimal Assist    Toileting Dependent   Minimal Assist    Bed Mobility  Supine to sit: Moderate Assist   Sit to supine: NT   Supine to sit: Supervision   Sit to supine: Supervision    Functional Transfers Moderate Assist   Supervision    Functional Mobility Minimal Assist     Ambulated in room short distances with w/w; cuing on posture, w/w management and safety   Supervision    Balance Sitting:     Static:  sup    Dynamic:SBA  Standing: min A     Activity Tolerance F-  F+   Visual/  Perceptual wfl                  Hand Dominance right   Strength ROM Additional Info:    RUE   4/5 wfl good  and wfl FMC/dexterity noted during ADL tasks     LUE 4/5 wfl good  and wfl FMC/dexterity noted during ADL tasks     Hearing: wfl  Sensation:wfl  Tone: wfl  Edema:none noted     Comments: Upon arrival patient supine in bed and agreeable to OT Session. Therapist educated pt on role of OT. At end of session, patient seated with call light and phone within reach, all lines and tubes intact. Overall patient demonstrated decreased independence and safety during completion of ADL/functional transfer/mobility tasks. Pt would benefit from continued skilled OT to increase safety and independence with completion of ADL/IADL tasks for functional independence and quality of life. Treatment: OT treatment provided this date includes: Therapist educated pt on role of OT, WBAT L LE and posterior precautions. Therapist facilitated bed mobility, unsupported sitting balance at EOB x25+ minutes (addressing posture, weight shifting, light dynamic reaching),  functional transfers (various surfaces; bed, chair; sit to stands / stand pivots), standing tolerance tasks (addressing posture, balance and activity tolerance) and short functional ambulation task with w/w - skilled cuing on hand / LE positioning, body mechanics and safety.   Therapist facilitated self-care retraining: UB/ LB self-care tasks, simulated toileting hygiene task and grooming task educating pt on modified techniques, posture, safety and energy conservation techniques. Skilled monitoring of BP, HR, O2 sats and pts response to treatment (101/75) - cuing on pursed lip breathing. Rehab Potential: Good for established goals     Patient / Family Goal:   None stated    Patient and/or family were instructed on functional diagnosis, prognosis/goals and OT plan of care. Demonstrated fair understanding. Eval Complexity: Low    Time In: 830  Time Out: 930  Total Treatment Time: 40 minutes    Min Units   OT Eval Low 97165  x  1   OT Eval Medium 40043      OT Eval High 67310      OT Re-Eval [id number]       Therapeutic Ex 49646       Therapeutic Activities 04595  25  2   ADL/Self Care 08941  15  1   Orthotic Management 86022       Manual 07116     Neuro Re-Ed 88313       Non-Billable Time          Evaluation Time additionally includes thorough review of current medical information, gathering information on past medical history/social history and prior level of function, interpretation of standardized testing/informal observation of tasks, assessment of data and development of plan of care and goals.           Leticia Huggins OTR/L #[id number]

## 2022-06-24 NOTE — PROGRESS NOTES
Department of Orthopedic Surgery  Resident Progress Note      SUBJECTIVE  Pt seen and examined. Pain controlled. No new complaints. +gas/-veBM. Denies NTP. Denies acute fever, chills, nausea, vomiting, chest pain or shortness of breath. No new complaints at this time. OBJECTIVE    Physical    VITALS:  BP (!) 98/54   Pulse 82   Temp 98 °F (36.7 °C) (Axillary)   Resp 16   Ht 5' 6\" (1.676 m)   Wt 210 lb 8 oz (95.5 kg)   SpO2 96%   BMI 33.98 kg/m²     left LE:   · Dressing C/D/I  · Distal sensory intact: Sural/saphenous/tibial/superficial and deep peroneal nerve  · +2/4 PT and DP  · +PF/DF/EHL/FHL  · Compartments soft and compressible    Data    CBC:   Lab Results   Component Value Date    WBC 11.8 06/23/2022    RBC 4.39 06/23/2022    HGB 13.6 06/23/2022    HCT 42.2 06/23/2022    MCV 96.1 06/23/2022    MCH 31.0 06/23/2022    MCHC 32.2 06/23/2022    RDW 13.2 06/23/2022     06/23/2022    MPV 10.1 06/23/2022     PT/INR:    Lab Results   Component Value Date    PROTIME 24.1 06/23/2022    INR 2.2 06/23/2022         ASSESSMENT   · Left HHA 6/23/2022    PLAN    · WBAT L LE  · 24 hour abx coverage  · Deep venous thrombosis prophylaxis -primary, early mobilization  · PT/OT when able  · Pain Control: IV and PO  · Monitor H&H: Pending a.m. labs. Currently asymptomatic. · D/C Plan: Pending CM/SW recommendations.

## 2022-06-24 NOTE — PROGRESS NOTES
CHIEF COMPLAINT:PAF/Hip fracture    HISTORY OF PRESENT ILLNESS: Patient is a 82 y.o. female seen at the request of Dr. Jairon Silver and followed by Dr. Annalise Arenas for PAF. Patient suffered a fall with hip fracture. Noted to be in atrial flutter with RVR prompting cardiology consultation. No CP, angina or SOB.      Past Medical History:   Diagnosis Date    Atrial fibrillation [facility])        Patient Active Problem List   Diagnosis    Atrial fibrillation with RVR ([address]    Fall       No Known Allergies    Current Facility-Administered Medications   Medication Dose Route Frequency Provider Last Rate Last Admin    buPROPion [facility] SR) extended release tablet 200 mg  200 mg Oral Daily Carlus Sages, DO   200 mg at 06/24/22 8029    dilTIAZem (CARDIZEM CD) extended release capsule 180 mg  180 mg Oral BID Carlus Sages, DO   180 mg at 06/24/22 8380    sertraline (ZOLOFT) tablet 100 mg  100 mg Oral Daily Carlus Sages, DO   100 mg at 06/23/22 2208    sodium chloride flush 0.9 % injection 10 mL  10 mL IntraVENous 2 times per day Carlus Sages, DO   10 mL at 06/24/22 0925    sodium chloride flush 0.9 % injection 10 mL  10 mL IntraVENous PRN Carlus Sages, DO        0.9 % sodium chloride infusion   IntraVENous PRN Carlus Sages, DO        promethazine (PHENERGAN) tablet 12.5 mg  12.5 mg Oral Q6H PRN Carlus Sages, DO        Or    ondansetron (ZOFRAN) injection 4 mg  4 mg IntraVENous Q6H PRN Carlus Sages, DO   4 mg at 06/23/22 0536    polyethylene glycol (GLYCOLAX) packet 17 g  17 g Oral Daily PRN Carlus Sages, DO        acetaminophen (TYLENOL) tablet 650 mg  650 mg Oral Q6H PRN Carlus Sages, DO        Or    acetaminophen (TYLENOL) suppository 650 mg  650 mg Rectal Q6H PRN Carlus Sages, DO        0.9 % sodium chloride infusion   IntraVENous Continuous Carlus Sages, DO 75 mL/hr at 06/23/22 1953 New Bag at 06/23/22 1953    oxyCODONE-acetaminophen (PERCOCET) 5-325 MG per tablet 1 tablet  1 tablet Oral Q6H PRN Carlus Sages, DO   1 tablet at 06/23/22 0910    dilTIAZem 100 mg in dextrose 5 % 100 mL infusion (ADD-Maxwell)  2.5-15 mg/hr IntraVENous Continuous Carlus Sages, DO 5 mL/hr at 06/24/22 0348 5 mg/hr at 06/24/22 0348    morphine (PF) injection 2 mg  2 mg IntraVENous Q2H PRN Carlus Sages, DO   2 mg at 06/24/22 0608    ceFAZolin (ANCEF) 1,000 mg in sterile water 10 mL IV syringe  1,000 mg IntraVENous Q8H Isaías Vilson, DO   1,000 mg at 06/24/22 0346       Social History     Socioeconomic History    Marital status:      Spouse name: Not on file    Number of children: Not on file    Years of education: Not on file    Highest education level: Not on file   Occupational History    Not on file   Tobacco Use    Smoking status: Never Smoker    Smokeless tobacco: Never Used   Substance and Sexual Activity    Alcohol use: Not Currently    Drug use: Never    Sexual activity: Not on file   Other Topics Concern    Not on file   Social History Narrative    Not on file     Social Determinants of Health     Financial Resource Strain:     Difficulty of Paying Living Expenses: Not on file   Food Insecurity:     Worried About Running Out of Food in the Last Year: Not on file    She of Food in the Last Year: Not on file   Transportation Needs:     Lack of Transportation (Medical): Not on file    Lack of Transportation (Non-Medical):  Not on file   Physical Activity:     Days of Exercise per Week: Not on file    Minutes of Exercise per Session: Not on file   Stress:     Feeling of Stress : Not on file   Social Connections:     Frequency of Communication with Friends and Family: Not on file    Frequency of Social Gatherings with Friends and Family: Not on file    Attends Christian Services: Not on file    Active Member of Clubs or Organizations: Not on file    Attends Club or Organization Meetings: Not on file    Marital Status: Not on file   Intimate Partner Violence:     Fear of Current or Ex-Partner: Not on file   Christine Tilley Emotionally Abused: Not on file    Physically Abused: Not on file    Sexually Abused: Not on file   Housing Stability:     Unable to Pay for Housing in the Last Year: Not on file    Number of Places Lived in the Last Year: Not on file    Unstable Housing in the Last Year: Not on file       History reviewed. No pertinent family history. Review of Systems:   Heart: as above   Lungs: as above   Eyes: denies changes in vision or discharge. Ears: denies changes in hearing or pain. Nose: denies epistaxis or masses   Throat: denies sore throat or trouble swallowing. Neuro: denies numbness, tingling, tremors. Skin: denies rashes or itching. : denies hematuria, dysuria   GI: denies vomiting, diarrhea   Psych: denies mood changed, anxiety, depression. all others negative. Physical Exam   BP (!) 112/100   Pulse 87   Temp 97.2 °F (36.2 °C) (Oral)   Resp 16   Ht 5' 6\" (1.676 m)   Wt 210 lb 8 oz (95.5 kg)   SpO2 98%   BMI 33.98 kg/m²   Constitutional: Oriented to person, place, and time. Well-developed and well-nourished. No distress. Head: Normocephalic and atraumatic. Eyes: EOM are normal. Pupils are equal, round, and reactive to light. Neck: Normal range of motion. Neck supple. No hepatojugular reflux and no JVD present. Carotid bruit is not present. No tracheal deviation present. No thyromegaly present. Cardiovascular: Normal rate, regular rhythm, normal heart sounds and intact distal pulses. Exam reveals no gallop and no friction rub. No murmur heard. Pulmonary/Chest: Effort normal and breath sounds normal. No respiratory distress. No wheezes. No rales. No tenderness. Abdominal: Soft. Bowel sounds are normal. No distension and no mass. No tenderness. No rebound and no guarding. Musculoskeletal: Normal range of motion. No edema and no tenderness. Lymphadenopathy:   No cervical adenopathy. No groin adenopathy. Neurological: Alert and oriented to person, place, and time.    Skin: Skin is warm and dry. No rash noted. Not diaphoretic. No erythema. Psychiatric: Normal mood and affect. Behavior is normal.     CBC:   Lab Results   Component Value Date    WBC 11.8 06/23/2022    RBC 4.39 06/23/2022    HGB 13.6 06/23/2022    HCT 42.2 06/23/2022    MCV 96.1 06/23/2022    MCH 31.0 06/23/2022    MCHC 32.2 06/23/2022    RDW 13.2 06/23/2022     06/23/2022    MPV 10.1 06/23/2022     BMP:   Lab Results   Component Value Date     06/23/2022    K 4.2 06/23/2022     06/23/2022    CO2 23 06/23/2022    BUN 15 06/23/2022    LABALBU 4.3 06/23/2022    CREATININE 0.8 06/23/2022    CALCIUM 9.1 06/23/2022    GFRAA >60 06/23/2022    LABGLOM >60 06/23/2022     Magnesium:  No results found for: MG  Cardiac Enzymes:   Lab Results   Component Value Date    TROPHS 20 (H) 06/23/2022    TROPHS 17 (H) 06/22/2022      PT/INR:    Lab Results   Component Value Date    PROTIME 24.1 06/23/2022    INR 2.2 06/23/2022     TSH:  No results found for: TSH    Rhythm Strip: Afib    EKG:  Atrial flutter/fib, nonspecific ST and T waves changes. ASSESSMENT AND PLAN:  Patient Active Problem List   Diagnosis    Atrial fibrillation with RVR ([address]    Fall     1. Atrial flutter:     Rate control. Stop cardizem gtt. PO cardizem. Dose dig IV x 1.      Start eliquis 5 mg BID when okay with surgery. Follow up with outpatient cardiology regarding DCCV if she does not revert to sinus. 2. Fall/Hip Fracture/Pre-op: Per ortho. Tolerate procedure. Patient stable from CV standpoint. Please call if needed. JITENDRA. Kishor Evans D.O.   Cardiologist  Cardiology, [address] [place] [address]

## 2022-06-25 LAB
ALBUMIN SERPL-MCNC: 2.9 G/DL (ref 3.5–5.2)
ALP BLD-CCNC: 74 U/L (ref 35–104)
ALT SERPL-CCNC: 232 U/L (ref 0–32)
ANION GAP SERPL CALCULATED.3IONS-SCNC: 8 MMOL/L (ref 7–16)
AST SERPL-CCNC: 119 U/L (ref 0–31)
BASOPHILS ABSOLUTE: 0.01 E9/L (ref 0–0.2)
BASOPHILS RELATIVE PERCENT: 0.1 % (ref 0–2)
BILIRUB SERPL-MCNC: 0.5 MG/DL (ref 0–1.2)
BUN BLDV-MCNC: 27 MG/DL (ref 6–23)
CALCIUM SERPL-MCNC: 8.2 MG/DL (ref 8.6–10.2)
CHLORIDE BLD-SCNC: 102 MMOL/L (ref 98–107)
CO2: 23 MMOL/L (ref 22–29)
CREAT SERPL-MCNC: 0.9 MG/DL (ref 0.5–1)
EOSINOPHILS ABSOLUTE: 0.07 E9/L (ref 0.05–0.5)
EOSINOPHILS RELATIVE PERCENT: 0.8 % (ref 0–6)
GFR AFRICAN AMERICAN: >60
GFR NON-AFRICAN AMERICAN: 60 ML/MIN/1.73
GLUCOSE BLD-MCNC: 126 MG/DL (ref 74–99)
HCT VFR BLD CALC: 30.3 % (ref 34–48)
HEMOGLOBIN: 9.3 G/DL (ref 11.5–15.5)
IMMATURE GRANULOCYTES #: 0.03 E9/L
IMMATURE GRANULOCYTES %: 0.4 % (ref 0–5)
LYMPHOCYTES ABSOLUTE: 0.66 E9/L (ref 1.5–4)
LYMPHOCYTES RELATIVE PERCENT: 7.7 % (ref 20–42)
MCH RBC QN AUTO: 30.9 PG (ref 26–35)
MCHC RBC AUTO-ENTMCNC: 30.7 % (ref 32–34.5)
MCV RBC AUTO: 100.7 FL (ref 80–99.9)
MONOCYTES ABSOLUTE: 0.75 E9/L (ref 0.1–0.95)
MONOCYTES RELATIVE PERCENT: 8.8 % (ref 2–12)
NEUTROPHILS ABSOLUTE: 7 E9/L (ref 1.8–7.3)
NEUTROPHILS RELATIVE PERCENT: 82.2 % (ref 43–80)
PDW BLD-RTO: 13.3 FL (ref 11.5–15)
PLATELET # BLD: 126 E9/L (ref 130–450)
PMV BLD AUTO: 10.5 FL (ref 7–12)
POTASSIUM REFLEX MAGNESIUM: 4.6 MMOL/L (ref 3.5–5)
POTASSIUM SERPL-SCNC: 4.6 MMOL/L (ref 3.5–5)
RBC # BLD: 3.01 E12/L (ref 3.5–5.5)
SODIUM BLD-SCNC: 133 MMOL/L (ref 132–146)
TOTAL PROTEIN: 5.2 G/DL (ref 6.4–8.3)
WBC # BLD: 8.5 E9/L (ref 4.5–11.5)

## 2022-06-25 PROCEDURE — 2700000000 HC OXYGEN THERAPY PER DAY

## 2022-06-25 PROCEDURE — 6370000000 HC RX 637 (ALT 250 FOR IP)

## 2022-06-25 PROCEDURE — 85025 COMPLETE CBC W/AUTO DIFF WBC: CPT

## 2022-06-25 PROCEDURE — 2140000000 HC CCU INTERMEDIATE R&B

## 2022-06-25 PROCEDURE — 36415 COLL VENOUS BLD VENIPUNCTURE: CPT

## 2022-06-25 PROCEDURE — 2580000003 HC RX 258

## 2022-06-25 PROCEDURE — 97530 THERAPEUTIC ACTIVITIES: CPT

## 2022-06-25 PROCEDURE — 80048 BASIC METABOLIC PNL TOTAL CA: CPT

## 2022-06-25 PROCEDURE — 80053 COMPREHEN METABOLIC PANEL: CPT

## 2022-06-25 RX ADMIN — SERTRALINE 100 MG: 100 TABLET, FILM COATED ORAL at 20:04

## 2022-06-25 RX ADMIN — BUPROPION HYDROCHLORIDE 200 MG: 100 TABLET, EXTENDED RELEASE ORAL at 09:18

## 2022-06-25 RX ADMIN — DILTIAZEM HYDROCHLORIDE 180 MG: 180 CAPSULE, EXTENDED RELEASE ORAL at 20:04

## 2022-06-25 RX ADMIN — DILTIAZEM HYDROCHLORIDE 180 MG: 180 CAPSULE, EXTENDED RELEASE ORAL at 09:18

## 2022-06-25 RX ADMIN — Medication 10 ML: at 09:18

## 2022-06-25 RX ADMIN — OXYCODONE AND ACETAMINOPHEN 1 TABLET: 5; 325 TABLET ORAL at 11:29

## 2022-06-25 ASSESSMENT — PAIN SCALES - GENERAL
PAINLEVEL_OUTOF10: 0
PAINLEVEL_OUTOF10: 5

## 2022-06-25 NOTE — PROGRESS NOTES
Physical Therapy    Pt to benefit from intensive skilled therapy at [place] to address functional deficits and return to independent PLOF.  Daughter interested in PM&R consult for [facility] ([facility]) PT, DPT  [id number]

## 2022-06-25 NOTE — PROGRESS NOTES
Hospitalist Progress Note      SYNOPSIS: Patient admitted on 2022 for     Patient presented to the emergency department after a fall. Patient was outside on a swing when she got up to walk back to the house and she tripped falling onto her left side. There is no injury to her head and no loss of consciousness. She fell on her left hip. Now complaining of left hip pain. Denies any chest pain, shortness of breath, fever, chills, nausea, vomiting. Patient's vital signs reveal tachycardia with a rate of 150. EKG showed atrial fibrillation with RVR. Remainder vital signs within normal limits and stable. Patient has a history of atrial fibrillation. Was given diltiazem in the emergency department. Rates currently in the 120s. Patient is chronically anticoagulated on Coumadin. Laboratory studies show an INR of 2.2. X-rays show acute displaced fracture of the left femoral neck. Orthopedics was consulted. Medicine consulted for admission. Patient underwent left hip hemiarthroplasty on 22. SUBJECTIVE:    Patient seen and examined in her room. Patient feeling much better today. Pain is well controlled. Patient back to her jolly self, cracking jokes. Records reviewed. Stable overnight. No other overnight issues reported. Temp (24hrs), Av.6 °F (37 °C), Min:98.1 °F (36.7 °C), Max:98.8 °F (37.1 °C)    DIET: ADULT DIET; Regular  CODE: Full Code    Intake/Output Summary (Last 24 hours) at 2022 0851  Last data filed at 2022 4903  Gross per 24 hour   Intake 380 ml   Output 1100 ml   Net -720 ml       OBJECTIVE:    /74   Pulse 87   Temp 98.8 °F (37.1 °C) (Oral)   Resp 14   Ht 5' 6\" (1.676 m)   Wt 210 lb 8 oz (95.5 kg)   SpO2 93%   BMI 33.98 kg/m²     General appearance: No apparent distress, appears stated age and cooperative. HEENT:  Conjunctivae/corneas clear. Neck: Supple. No jugular venous distention.    Respiratory: Clear to auscultation bilaterally, normal respiratory effort  Cardiovascular: Regular rate rhythm, normal S1-S2  Abdomen: Soft, nontender, nondistended  Musculoskeletal: No clubbing, cyanosis, no bilateral lower extremity edema. Brisk capillary refill. Skin:  No rashes  on visible skin  Neurologic: awake, alert and following commands       ASSESSMENT:  -Atrial fibrillation with RVR, resolved  -Left femoral neck fracture  -Fall at home  -Chronically anticoagulated on Coumadin  Anemia, acute blood loss anemia, postoperative  Elevated liver enzymes, likely from anesthesia, repeat on the morning of 6/26        PLAN:  -Telemetry  -Pain management  -Hold Coumadin and anticoagulants  Status post left hip hemiarthroplasty on 6/23/2022  A. fib with RVR improving on the morning of 6/24, plan on weaning of Cardizem drip, Cardizem drip of by the evening of 6/24  Will discuss with orthopedic team and cardiology recommended restart date for Eliquis       DISPOSITION:   Possible discharge in 6/27/2022.     Medications:  REVIEWED DAILY    Infusion Medications    sodium chloride      sodium chloride 75 mL/hr at 06/23/22 1953     Scheduled Medications    buPROPion  200 mg Oral Daily    dilTIAZem  180 mg Oral BID    sertraline  100 mg Oral Daily    sodium chloride flush  10 mL IntraVENous 2 times per day     PRN Meds: sodium chloride flush, sodium chloride, promethazine **OR** ondansetron, polyethylene glycol, acetaminophen **OR** acetaminophen, oxyCODONE-acetaminophen, morphine    Labs:     Recent Labs     06/22/22 2353 06/23/22  0640 06/25/22  0552   WBC 10.0 11.8* 8.5   HGB 13.3 13.6 9.3*   HCT 40.8 42.2 30.3*    189 126*       Recent Labs     06/22/22  2353 06/23/22  0640 06/25/22  0552    139 133   K 4.3 4.2 4.6    101 102   CO2 25 23 23   BUN 18 15 27*   CREATININE 0.9 0.8 0.9   CALCIUM 9.2 9.1 8.2*       Recent Labs     06/23/22  0640 06/25/22  0552   PROT 7.0 5.2*   ALKPHOS 72 74   ALT 17 232*   AST 21 119*   BILITOT 0.9 0.5 Recent Labs     06/22/22  2353 06/23/22  1121   INR 2.2 2.2       No results for input(s): Melissa Comment in the last 72 hours. Chronic labs:    Lab Results   Component Value Date    INR 2.2 06/23/2022       Radiology: REVIEWED DAILY    +++++++++++++++++++++++++++++++++++++++++++++++++  Joanna Fernández MD  [facility] Physician - [address] [place] [address]  +++++++++++++++++++++++++++++++++++++++++++++++++  NOTE: This report was transcribed using voice recognition software. Every effort was made to ensure accuracy; however, inadvertent computerized transcription errors may be present.

## 2022-06-25 NOTE — PROGRESS NOTES
Physical Therapy  Physical Therapy Treatment    Name: Vivi Aldana  : 1935  MRN: [id number]      Date of Service: 2022    Evaluating PT:  Bobby Mast PT, DPT [id number]    Room #:  7756/6372-G  Diagnosis:  Atrial fibrillation with RVR ([facility] [address] [I48.91]  Left displaced femoral neck fracture ([facility] [address] Hakeem Riser  Fall, initial encounter [W19. XXXA]  PMHx/PSHx:  A fib  Procedure/Surgery:  L hip hemiarthroplasty   Precautions:  Falls, posterior hip precautions, WBAT LLE, O2  Equipment Needs:  TBD pending pt progress    SUBJECTIVE:    Pt lives with  in a 1 story apartment (independent living) with level entry. Bed is on 1st floor and bath is on 1st floor. Pt ambulated with rollator in community and no AD in apartment PTA. OBJECTIVE:   Initial Evaluation  Date: 22 Treatment  22 Short Term/ Long Term   Goals   AM-PAC 6 Clicks 54/36 99/88    Was pt agreeable to Eval/treatment? yes yes    Does pt have pain? 3/10 after activity in L hip L hip pain with movement, does not rate    Bed Mobility  Rolling: Reina  Supine to sit: Reina  Sit to supine: Reina  Scooting: Reina Rolling: Reina  Supine to sit: Reina  Sit to supine: Reina  Scooting: Reina Rolling: Independent   Supine to sit: Independent   Sit to supine: Independent   Scooting: Independent    Transfers Sit to stand: Reina  Stand to sit: Reina  Stand pivot: Reina with [employer] Sit to stand: Reina  Stand to sit: Reina  Stand pivot: Reina with [employer] Sit to stand: Independent   Stand to sit: Independent   Stand pivot: Mod I with [employer]   Ambulation    30 feet with [employer] Reina 30 feet with [employer] Reina >150 feet with [employer] mod I   Stair negotiation: ascended and descended  NT NT TBD   ROM BUE:  Defer to OT note  BLE:  RLE WFL, LLE NT     Strength BUE:  Defer to OT note  BLE:  RLE grossly 4/5, LLE NT  WNL   Balance Sitting EOB:  SBA  Dynamic Standing:  Reina with [employer] Sitting EOB:  SBA  Dynamic Standing:  Reina with [employer] Sitting EOB:  Independent   Dynamic Standing:   Mod I with [employer]     Pt is A & O x 4  Sensation:  Denies abnormalities  Edema:  None noted    Vitals:  SPO2 on 2L throughout: 92-95%    Patient education  Pt educated on role of PT, safety during mobility    Patient response to education:   Pt verbalized understanding Pt demonstrated skill Pt requires further education in this area   yes yes yes     ASSESSMENT:    Comments:  Patient semi-supine in bed upon entry and agreeable to PT treatment. Pt with increased fatigue this date reporting poor sleep. Pt able to complete bed mobility with light assist to LLE. Pt able to stand and ambulate same distance with [address] as previous session. Limited by fatigue. Extra time required for ambulation d/t step to gait pattern and slow overall gait speed. However, pt agreeable to sit in chair at end of session with all needs met. Daughter educated on pt performance and rehabilitation at [place]. Treatment:  Patient practiced and was instructed in the following treatment:     Bed Mobility: VCs provided for sequencing and safety during mobility. Manual assist provided for completion of task.  Transfer Training: Verbal and tactile cueing provided for sequencing and safety during mobility. Manual assist provided for completion of task   Gait Training: Ambulation with [address] and verbal cues for proper technique and safety. Manual assist provided for completion of task    Patient Education: Education provided on sequencing for step to gait pattern, rehabilitation at [place]    PLAN:    Patient is making good progress towards established goals. Will continue with current POC.       Time in  1123  Time out  1153    Total Treatment Time  30 minutes     CPT codes:  [] Gait training 93240 - minutes  [] Manual therapy 06636 - minutes  [x] Therapeutic activities 76474 30 minutes  [] Therapeutic exercises 05466 - minutes  [] Neuromuscular reeducation 80740 - minutes    Eliu Hart PT, DPT  [id number]

## 2022-06-25 NOTE — PROGRESS NOTES
Department of Orthopedic Surgery  Resident Progress Note      SUBJECTIVE  Pt seen and examined. Pain well controlled. No new complaints. +gas/-veBM. Denies NTP. Denies acute fever, chills, nausea, vomiting, chest pain or shortness of breath. No new complaints at this time. OBJECTIVE    Physical    VITALS:  /72   Pulse 79   Temp 98.1 °F (36.7 °C) (Oral)   Resp 16   Ht 5' 6\" (1.676 m)   Wt 210 lb 8 oz (95.5 kg)   SpO2 96%   BMI 33.98 kg/m²     left LE:   · Dressing C/D/I  · Distal sensory intact: Sural/saphenous/tibial/superficial and deep peroneal nerve  · +2/4 PT and DP  · +PF/DF/EHL/FHL  · Compartments soft and compressible    Data    CBC:   Lab Results   Component Value Date    WBC 8.5 06/25/2022    RBC 3.01 06/25/2022    HGB 9.3 06/25/2022    HCT 30.3 06/25/2022    .7 06/25/2022    MCH 30.9 06/25/2022    MCHC 30.7 06/25/2022    RDW 13.3 06/25/2022     06/25/2022    MPV 10.5 06/25/2022     PT/INR:    Lab Results   Component Value Date    PROTIME 24.1 06/23/2022    INR 2.2 06/23/2022         ASSESSMENT   · Left HHA 6/23/2022    PLAN    · WBAT L LE  · 24 hour abx coverage: Complete  · Deep venous thrombosis prophylaxis -primary, early mobilization  · PT/OT when able. 16/24  AM PAC  · Pain Control: IV and PO  · Monitor H&H: 9.3  · D/C Plan: Pending CM/SW recommendations. Okay to discharge from orthopedic standpoint when medically stable and have made appropriate physical therapy gains. Patient will follow up with Dr. Musa Woodward in 2 weeks in office.

## 2022-06-26 LAB
ALBUMIN SERPL-MCNC: 3.2 G/DL (ref 3.5–5.2)
ALP BLD-CCNC: 72 U/L (ref 35–104)
ALT SERPL-CCNC: 159 U/L (ref 0–32)
ANION GAP SERPL CALCULATED.3IONS-SCNC: 14 MMOL/L (ref 7–16)
AST SERPL-CCNC: 61 U/L (ref 0–31)
BASOPHILS ABSOLUTE: 0.02 E9/L (ref 0–0.2)
BASOPHILS RELATIVE PERCENT: 0.2 % (ref 0–2)
BILIRUB SERPL-MCNC: 0.8 MG/DL (ref 0–1.2)
BUN BLDV-MCNC: 21 MG/DL (ref 6–23)
CALCIUM SERPL-MCNC: 8.5 MG/DL (ref 8.6–10.2)
CHLORIDE BLD-SCNC: 100 MMOL/L (ref 98–107)
CO2: 23 MMOL/L (ref 22–29)
CREAT SERPL-MCNC: 0.9 MG/DL (ref 0.5–1)
EOSINOPHILS ABSOLUTE: 0.26 E9/L (ref 0.05–0.5)
EOSINOPHILS RELATIVE PERCENT: 2.8 % (ref 0–6)
GFR AFRICAN AMERICAN: >60
GFR NON-AFRICAN AMERICAN: 60 ML/MIN/1.73
GLUCOSE BLD-MCNC: 106 MG/DL (ref 74–99)
HCT VFR BLD CALC: 29.8 % (ref 34–48)
HEMOGLOBIN: 9.5 G/DL (ref 11.5–15.5)
IMMATURE GRANULOCYTES #: 0.05 E9/L
IMMATURE GRANULOCYTES %: 0.5 % (ref 0–5)
LYMPHOCYTES ABSOLUTE: 0.95 E9/L (ref 1.5–4)
LYMPHOCYTES RELATIVE PERCENT: 10.4 % (ref 20–42)
MCH RBC QN AUTO: 31.1 PG (ref 26–35)
MCHC RBC AUTO-ENTMCNC: 31.9 % (ref 32–34.5)
MCV RBC AUTO: 97.7 FL (ref 80–99.9)
MONOCYTES ABSOLUTE: 0.8 E9/L (ref 0.1–0.95)
MONOCYTES RELATIVE PERCENT: 8.8 % (ref 2–12)
NEUTROPHILS ABSOLUTE: 7.06 E9/L (ref 1.8–7.3)
NEUTROPHILS RELATIVE PERCENT: 77.3 % (ref 43–80)
PDW BLD-RTO: 13.3 FL (ref 11.5–15)
PLATELET # BLD: 142 E9/L (ref 130–450)
PMV BLD AUTO: 10.4 FL (ref 7–12)
POTASSIUM REFLEX MAGNESIUM: 4 MMOL/L (ref 3.5–5)
POTASSIUM SERPL-SCNC: 4 MMOL/L (ref 3.5–5)
RBC # BLD: 3.05 E12/L (ref 3.5–5.5)
SODIUM BLD-SCNC: 137 MMOL/L (ref 132–146)
TOTAL PROTEIN: 5.9 G/DL (ref 6.4–8.3)
WBC # BLD: 9.1 E9/L (ref 4.5–11.5)

## 2022-06-26 PROCEDURE — 2700000000 HC OXYGEN THERAPY PER DAY

## 2022-06-26 PROCEDURE — 80048 BASIC METABOLIC PNL TOTAL CA: CPT

## 2022-06-26 PROCEDURE — 6370000000 HC RX 637 (ALT 250 FOR IP)

## 2022-06-26 PROCEDURE — 85025 COMPLETE CBC W/AUTO DIFF WBC: CPT

## 2022-06-26 PROCEDURE — 80053 COMPREHEN METABOLIC PANEL: CPT

## 2022-06-26 PROCEDURE — 36415 COLL VENOUS BLD VENIPUNCTURE: CPT

## 2022-06-26 PROCEDURE — 6370000000 HC RX 637 (ALT 250 FOR IP): Performed by: UROLOGY

## 2022-06-26 PROCEDURE — 2140000000 HC CCU INTERMEDIATE R&B

## 2022-06-26 RX ORDER — SENNA AND DOCUSATE SODIUM 50; 8.6 MG/1; MG/1
1 TABLET, FILM COATED ORAL DAILY
Status: DISCONTINUED | OUTPATIENT
Start: 2022-06-26 | End: 2022-06-27 | Stop reason: HOSPADM

## 2022-06-26 RX ADMIN — DILTIAZEM HYDROCHLORIDE 180 MG: 180 CAPSULE, EXTENDED RELEASE ORAL at 08:09

## 2022-06-26 RX ADMIN — SERTRALINE 100 MG: 100 TABLET, FILM COATED ORAL at 20:45

## 2022-06-26 RX ADMIN — SENNOSIDES AND DOCUSATE SODIUM 1 TABLET: 50; 8.6 TABLET ORAL at 20:45

## 2022-06-26 RX ADMIN — BUPROPION HYDROCHLORIDE 200 MG: 100 TABLET, EXTENDED RELEASE ORAL at 08:09

## 2022-06-26 RX ADMIN — DILTIAZEM HYDROCHLORIDE 180 MG: 180 CAPSULE, EXTENDED RELEASE ORAL at 20:45

## 2022-06-26 ASSESSMENT — PAIN SCALES - GENERAL: PAINLEVEL_OUTOF10: 4

## 2022-06-26 NOTE — PROGRESS NOTES
Hospitalist Progress Note      SYNOPSIS: Patient admitted on 2022 for     Patient presented to the emergency department after a fall. Patient was outside on a swing when she got up to walk back to the house and she tripped falling onto her left side. There is no injury to her head and no loss of consciousness. She fell on her left hip. Now complaining of left hip pain. Denies any chest pain, shortness of breath, fever, chills, nausea, vomiting. Patient's vital signs reveal tachycardia with a rate of 150. EKG showed atrial fibrillation with RVR. Remainder vital signs within normal limits and stable. Patient has a history of atrial fibrillation. Was given diltiazem in the emergency department. Rates currently in the 120s. Patient is chronically anticoagulated on Coumadin. Laboratory studies show an INR of 2.2. X-rays show acute displaced fracture of the left femoral neck. Orthopedics was consulted. Medicine consulted for admission. Patient underwent left hip hemiarthroplasty on 22. SUBJECTIVE:    Patient seen and examined in her room. Patient appears comfortable. Denies any chest pain, nausea, vomiting. Records reviewed. Stable overnight. No other overnight issues reported. Temp (24hrs), Av.3 °F (36.8 °C), Min:97.4 °F (36.3 °C), Max:98.8 °F (37.1 °C)    DIET: ADULT DIET; Regular  CODE: Full Code    Intake/Output Summary (Last 24 hours) at 2022 0832  Last data filed at 2022 0529  Gross per 24 hour   Intake 780 ml   Output 600 ml   Net 180 ml       OBJECTIVE:    BP (!) 144/75   Pulse 83   Temp 98.4 °F (36.9 °C) (Oral)   Resp 16   Ht 5' 6\" (1.676 m)   Wt 210 lb 8 oz (95.5 kg)   SpO2 92%   BMI 33.98 kg/m²     General appearance: No apparent distress, appears stated age and cooperative. HEENT:  Conjunctivae/corneas clear. Neck: Supple. No jugular venous distention.    Respiratory: Clear to auscultation bilaterally, normal respiratory effort  Cardiovascular: Regular rate rhythm, normal S1-S2  Abdomen: Soft, nontender, nondistended  Musculoskeletal: No clubbing, cyanosis, no bilateral lower extremity edema. Brisk capillary refill. Skin:  No rashes  on visible skin  Neurologic: awake, alert and following commands       ASSESSMENT:  -Atrial fibrillation with RVR, resolved  -Left femoral neck fracture  -Fall at home  -Chronically anticoagulated on Coumadin  Anemia, acute blood loss anemia, postoperative  Elevated liver enzymes, likely from anesthesia, repeat on the morning of 6/26, improving        PLAN:  -Telemetry  -Pain management  -Hold Coumadin and anticoagulants  Status post left hip hemiarthroplasty on 6/23/2022  A. fib with RVR improving on the morning of 6/24, plan on weaning of Cardizem drip, Cardizem drip of by the evening of 6/24  Will discuss with orthopedic team and cardiology recommended restart date for Eliquis       DISPOSITION:   Possible discharge in 6/27/2022. Medications:  REVIEWED DAILY    Infusion Medications    sodium chloride       Scheduled Medications    buPROPion  200 mg Oral Daily    dilTIAZem  180 mg Oral BID    sertraline  100 mg Oral Daily    sodium chloride flush  10 mL IntraVENous 2 times per day     PRN Meds: sodium chloride flush, sodium chloride, promethazine **OR** ondansetron, polyethylene glycol, oxyCODONE-acetaminophen, morphine    Labs:     Recent Labs     06/25/22  0552 06/26/22  0541   WBC 8.5 9.1   HGB 9.3* 9.5*   HCT 30.3* 29.8*   * 142       Recent Labs     06/25/22  0552 06/26/22  0541    137   K 4.6  4.6 4.0    100   CO2 23 23   BUN 27* 21   CREATININE 0.9 0.9   CALCIUM 8.2* 8.5*       Recent Labs     06/25/22  0552 06/26/22  0541   PROT 5.2* 5.9*   ALKPHOS 74 72   * 159*   * 61*   BILITOT 0.5 0.8       Recent Labs     06/23/22  1121   INR 2.2       No results for input(s): CKTOTAL, TROPONINI in the last 72 hours.     Chronic labs:    Lab Results   Component Value Date    INR 2.2 06/23/2022       Radiology: REVIEWED DAILY    +++++++++++++++++++++++++++++++++++++++++++++++++  Carlyle Nur MD  [facility] Physician - [address] [place] [address]  +++++++++++++++++++++++++++++++++++++++++++++++++  NOTE: This report was transcribed using voice recognition software. Every effort was made to ensure accuracy; however, inadvertent computerized transcription errors may be present.

## 2022-06-26 NOTE — CONSULTS
INPATIENT CONSULTATION RECORD FOR  6/26/2022      [facility]  [address]. [facility], [address] [address] [facility]  [phone]        REASON FOR CONSULTATION:      Possible urinary retention    HISTORY OF PRESENT ILLNESS:      The patient is a 82 y.o. female patient who presents with fall sustaining a fractured hip. She underwent hip surgery. Yesterday her catheter was removed and she has been having trouble with frequency and incomplete emptying. Bladder scan postvoid residuals 425 mL. A Martin catheter was placed and the actual postvoid residual was 150 mL. The patient does not give a history and is sleeping. Her daughter is at her bedside entire history was obtained from her daughter. Past Medical History:   Diagnosis Date    Atrial fibrillation [facility])          Past Surgical History:   Procedure Laterality Date    HIP SURGERY Left 6/23/2022    HIP HEMIARTHROPLASTY performed by Caridad Levy [facility] OR       Medications Prior to Admission:    Medications Prior to Admission: folic acid (FOLVITE) 136 MCG tablet, Take 800 mcg by mouth daily  calcium carbonate (OSCAL) 500 MG TABS tablet, Take 200 mg by mouth daily  meclizine (ANTIVERT) 25 MG tablet, Take 25 mg by mouth 3 times daily as needed for Dizziness  buPROPion (WELLBUTRIN SR) 200 MG extended release tablet, Take 200 mg by mouth daily  dilTIAZem (CARDIZEM CD) 180 MG extended release capsule, Take 180 mg by mouth 2 times daily  sertraline (ZOLOFT) 100 MG tablet, Take 100 mg by mouth daily  warfarin (COUMADIN) 5 MG tablet, Take 5 mg by mouth four times a week Sunday, Tuesday, Thursday, Saturday  warfarin (COUMADIN) 2.5 MG tablet, Take 2.5 mg by mouth three times a week Monday, Wednesday, Friday    Allergies:    Patient has no known allergies. Social History:    reports that she has never smoked. She has never used smokeless tobacco. She reports previous alcohol use. She reports that she does not use drugs.     Family History: Non-contributory to this Urological problem  family history is not on file. REVIEW OF SYSTEMS:  Unable to obtain as the patient is sleeping  Increased somnolence    PHYSICAL EXAM:    Vitals:  BP (!) 144/75   Pulse 83   Temp 98.4 °F (36.9 °C) (Oral)   Resp 16   Ht 5' 6\" (1.676 m)   Wt 210 lb 8 oz (95.5 kg)   SpO2 92%   BMI 33.98 kg/m²     General: Sleeping soundly  HEENT:  Normocephalic, atraumatic. Pupils equal, round. No scleral icterus. No conjunctival injection. Normal lips, teeth, and gums. No nasal discharge. Neck:  Supple, no masses. Lungs:  No audible wheezing. Respirations symmetric and non-labored. Extremities:  No clubbing, cyanosis, or edema  Skin:  Warm and dry, no open lesions or rashes  Neuro: Unable to obtain  Rectal: deferred  Genitalia:  deferred    LABS:    Lab Results   Component Value Date    WBC 9.1 06/26/2022    HGB 9.5 (L) 06/26/2022    HCT 29.8 (L) 06/26/2022    MCV 97.7 06/26/2022     06/26/2022       Lab Results   Component Value Date    CREATININE 0.9 06/26/2022         ASSESSMENT / PLAN:      1. Possible urinary retention. Post void residual actually only 150 cc. Very likely situational.  I discussed with her daughter from [place] who is at her bedside today that this will likely be transient once her bowels are moving better, when she is ambulatory. Will give a trial of voiding prior to discharge. If no improvement will consider urodynamic testing in the office.       Alec Price MD, M.CONNIE.  3:01 PM  6/26/2022

## 2022-06-27 VITALS
HEIGHT: 66 IN | BODY MASS INDEX: 32.62 KG/M2 | WEIGHT: 203 LBS | SYSTOLIC BLOOD PRESSURE: 135 MMHG | TEMPERATURE: 97.5 F | OXYGEN SATURATION: 96 % | RESPIRATION RATE: 18 BRPM | HEART RATE: 77 BPM | DIASTOLIC BLOOD PRESSURE: 69 MMHG

## 2022-06-27 LAB
ALBUMIN SERPL-MCNC: 3.1 G/DL (ref 3.5–5.2)
ALP BLD-CCNC: 64 U/L (ref 35–104)
ALT SERPL-CCNC: 102 U/L (ref 0–32)
ANION GAP SERPL CALCULATED.3IONS-SCNC: 7 MMOL/L (ref 7–16)
AST SERPL-CCNC: 35 U/L (ref 0–31)
BASOPHILS ABSOLUTE: 0.03 E9/L (ref 0–0.2)
BASOPHILS RELATIVE PERCENT: 0.4 % (ref 0–2)
BILIRUB SERPL-MCNC: 0.8 MG/DL (ref 0–1.2)
BUN BLDV-MCNC: 18 MG/DL (ref 6–23)
CALCIUM SERPL-MCNC: 8.3 MG/DL (ref 8.6–10.2)
CHLORIDE BLD-SCNC: 101 MMOL/L (ref 98–107)
CO2: 31 MMOL/L (ref 22–29)
CREAT SERPL-MCNC: 0.8 MG/DL (ref 0.5–1)
EOSINOPHILS ABSOLUTE: 0.36 E9/L (ref 0.05–0.5)
EOSINOPHILS RELATIVE PERCENT: 4.3 % (ref 0–6)
GFR AFRICAN AMERICAN: >60
GFR NON-AFRICAN AMERICAN: >60 ML/MIN/1.73
GLUCOSE BLD-MCNC: 102 MG/DL (ref 74–99)
HCT VFR BLD CALC: 29.1 % (ref 34–48)
HEMOGLOBIN: 9.2 G/DL (ref 11.5–15.5)
IMMATURE GRANULOCYTES #: 0.04 E9/L
IMMATURE GRANULOCYTES %: 0.5 % (ref 0–5)
INR BLD: 1.2
LYMPHOCYTES ABSOLUTE: 0.91 E9/L (ref 1.5–4)
LYMPHOCYTES RELATIVE PERCENT: 10.9 % (ref 20–42)
MCH RBC QN AUTO: 31 PG (ref 26–35)
MCHC RBC AUTO-ENTMCNC: 31.6 % (ref 32–34.5)
MCV RBC AUTO: 98 FL (ref 80–99.9)
MONOCYTES ABSOLUTE: 0.76 E9/L (ref 0.1–0.95)
MONOCYTES RELATIVE PERCENT: 9.1 % (ref 2–12)
NEUTROPHILS ABSOLUTE: 6.24 E9/L (ref 1.8–7.3)
NEUTROPHILS RELATIVE PERCENT: 74.8 % (ref 43–80)
PDW BLD-RTO: 13.3 FL (ref 11.5–15)
PLATELET # BLD: 129 E9/L (ref 130–450)
PMV BLD AUTO: 10.6 FL (ref 7–12)
POTASSIUM REFLEX MAGNESIUM: 3.9 MMOL/L (ref 3.5–5)
POTASSIUM SERPL-SCNC: 3.9 MMOL/L (ref 3.5–5)
PROTHROMBIN TIME: 12.6 SEC (ref 9.3–12.4)
RBC # BLD: 2.97 E12/L (ref 3.5–5.5)
SARS-COV-2, NAAT: NOT DETECTED
SODIUM BLD-SCNC: 139 MMOL/L (ref 132–146)
TOTAL PROTEIN: 5.4 G/DL (ref 6.4–8.3)
WBC # BLD: 8.3 E9/L (ref 4.5–11.5)

## 2022-06-27 PROCEDURE — 36415 COLL VENOUS BLD VENIPUNCTURE: CPT

## 2022-06-27 PROCEDURE — 85610 PROTHROMBIN TIME: CPT

## 2022-06-27 PROCEDURE — 6370000000 HC RX 637 (ALT 250 FOR IP)

## 2022-06-27 PROCEDURE — 85025 COMPLETE CBC W/AUTO DIFF WBC: CPT

## 2022-06-27 PROCEDURE — 2700000000 HC OXYGEN THERAPY PER DAY

## 2022-06-27 PROCEDURE — 87635 SARS-COV-2 COVID-19 AMP PRB: CPT

## 2022-06-27 PROCEDURE — 97530 THERAPEUTIC ACTIVITIES: CPT

## 2022-06-27 PROCEDURE — 6370000000 HC RX 637 (ALT 250 FOR IP): Performed by: UROLOGY

## 2022-06-27 PROCEDURE — 80053 COMPREHEN METABOLIC PANEL: CPT

## 2022-06-27 PROCEDURE — 80048 BASIC METABOLIC PNL TOTAL CA: CPT

## 2022-06-27 PROCEDURE — 97535 SELF CARE MNGMENT TRAINING: CPT

## 2022-06-27 PROCEDURE — 51702 INSERT TEMP BLADDER CATH: CPT

## 2022-06-27 RX ORDER — WARFARIN SODIUM 5 MG/1
5 TABLET ORAL
Status: DISCONTINUED | OUTPATIENT
Start: 2022-06-28 | End: 2022-06-27 | Stop reason: HOSPADM

## 2022-06-27 RX ORDER — WARFARIN SODIUM 2.5 MG/1
2.5 TABLET ORAL
Status: DISCONTINUED | OUTPATIENT
Start: 2022-06-27 | End: 2022-06-27 | Stop reason: HOSPADM

## 2022-06-27 RX ADMIN — SENNOSIDES AND DOCUSATE SODIUM 1 TABLET: 50; 8.6 TABLET ORAL at 09:18

## 2022-06-27 RX ADMIN — DILTIAZEM HYDROCHLORIDE 180 MG: 180 CAPSULE, EXTENDED RELEASE ORAL at 09:18

## 2022-06-27 RX ADMIN — OXYCODONE AND ACETAMINOPHEN 1 TABLET: 5; 325 TABLET ORAL at 12:05

## 2022-06-27 RX ADMIN — BUPROPION HYDROCHLORIDE 200 MG: 100 TABLET, EXTENDED RELEASE ORAL at 09:17

## 2022-06-27 ASSESSMENT — PAIN - FUNCTIONAL ASSESSMENT: PAIN_FUNCTIONAL_ASSESSMENT: PREVENTS OR INTERFERES WITH MANY ACTIVE NOT PASSIVE ACTIVITIES

## 2022-06-27 ASSESSMENT — PAIN DESCRIPTION - LOCATION: LOCATION: INCISION

## 2022-06-27 ASSESSMENT — PAIN DESCRIPTION - ORIENTATION: ORIENTATION: RIGHT

## 2022-06-27 ASSESSMENT — PAIN DESCRIPTION - DESCRIPTORS: DESCRIPTORS: ACHING;THROBBING;SHOOTING

## 2022-06-27 ASSESSMENT — PAIN SCALES - GENERAL: PAINLEVEL_OUTOF10: 8

## 2022-06-27 NOTE — PROGRESS NOTES
Physical Therapy  Physical Therapy Treatment    Name: Annmarie Tena  : 1935  MRN: [id number]      Date of Service: 2022    Evaluating PT:  Piter Payan PT, DPT [id number]    Room #:  7227/7146-F  Diagnosis:  Atrial fibrillation with RVR ([facility] [address] [I48.91]  Left displaced femoral neck fracture ([facility] [address] Bennett Garrett  Fall, initial encounter [W19. XXXA]  PMHx/PSHx:  A fib  Procedure/Surgery:  L hip hemiarthroplasty   Precautions:  Falls, posterior hip precautions, WBAT LLE, O2  Equipment Needs:  TBD pending pt progress    SUBJECTIVE:    Pt lives with  in a 1 story apartment (independent living) with level entry. Bed is on 1st floor and bath is on 1st floor. Pt ambulated with rollator in community and no AD in apartment PTA. OBJECTIVE:   Initial Evaluation  Date: 22 Treatment  22 Short Term/ Long Term   Goals   AM-PAC 6 Clicks 79/69 51/46    Was pt agreeable to Eval/treatment? yes yes    Does pt have pain? 3/10 after activity in L hip L hip pain with movement, does not rate    Bed Mobility  Rolling: Reina  Supine to sit: Reina  Sit to supine: Reina  Scooting: Reina NT Rolling: Independent   Supine to sit: Independent   Sit to supine: Independent   Scooting: Independent    Transfers Sit to stand: Reina  Stand to sit: Reina  Stand pivot: Reina with [employer] Sit to stand: Reina  Stand to sit: Reina  Stand pivot: Reina with [employer] Sit to stand: Independent   Stand to sit: Independent   Stand pivot: Mod I with [employer]   Ambulation    30 feet with [employer] Reina 40 feet with [employer] Reina >150 feet with [employer] mod I   Stair negotiation: ascended and descended  NT NT TBD   ROM BUE:  Defer to OT note  BLE:  RLE WFL, LLE NT     Strength BUE:  Defer to OT note  BLE:  RLE grossly 4/5, LLE NT  WNL   Balance Sitting EOB:  SBA  Dynamic Standing:  Reina with [employer] Sitting EOB:  SBA  Dynamic Standing:  Reina with [employer] Sitting EOB:  Independent   Dynamic Standing:   Mod I with [employer]     Pt is A & O x 4  Sensation:  Denies abnormalities  Edema:  None noted    Vitals:  SPO2 on 2L throughout: 92-95%,  BPM post-amb    Patient education  Pt educated on role of PT, TE, tx, gait,  safety during mobility    Patient response to education:   Pt verbalized understanding Pt demonstrated skill Pt requires further education in this area   yes yes yes     ASSESSMENT:    Comments:  Pt seated in bedside chair, agreeable to therapy session. Pt continues to require steadying assist upon standing/sitting. Pt demonstrates very slow amb pace, mild antalgic pattern. Provided verbal cueing for progressing to step through pattern, pt demo'd half step through when advancing RLE. Provided pt with seated TE to help with edema control and AROM. Pt in no apparent distress, o2 intact, and call light in reach at end of session. Pt continues to require hands on assist to safely complete mobility. Pt would benefit from skilled therapy services at [facility] to optimize independence with functional mobility prior to d/c home. Treatment:  Patient practiced and was instructed in the following treatment:     Transfer Training: Verbal and tactile cueing provided for sequencing and safety during mobility. Manual assist provided for completion of task   Gait Training: Ambulation with [employer] and verbal cues for proper technique and safety. Manual assist provided for completion of task    TE- LAQ and ankle pumps, x10 reps each leg. Educated pt to complete periodically throughout the day to aide in edema control and maintaining AROM.  Patient Education: Education provided on sequencing for step to gait pattern, rehabilitation at [facility]    PLAN:    Patient is making good progress towards established goals. Will continue with current POC.       Time in  1415  Time out  1440    Total Treatment Time  25 minutes     CPT codes:  [] Gait training 78564 - minutes  [] Manual therapy [id number] - minutes  [x] Therapeutic activities 51566 25 minutes  [] Therapeutic exercises 82107 - minutes  [] [facility] 00238 - minutes    Salomón Banks., PT, DPT  [id number]

## 2022-06-27 NOTE — PROGRESS NOTES
1800  Tt RN Jonathan Arnold from [facility] for nurse to nurse report. Prior to d/c Dr. Favian Chilel changed medications from Coumadin to Eliquis after discussing with patient and daughter. Martin removed prior to d/c with 18 cc PVR.     Elana Gonzalez RN

## 2022-06-27 NOTE — CARE COORDINATION
Patient's daughter, Giuseppe Jansen presented at the case management office requesting to see SW. Met with patient and Luzmaria at bedside. Updated her regarding PT/OT seeing patient today for updated notes. Discussed patient may not meet criteria for skilled nursing depending on her PT/OT scores. Luzmaria reports the patient is not able to return home in her current condition and needs rehab. [facility], alternate choice is [facility] in [place]. Contacted [facility] on the [facility], [facility] and they have no [facility] available. [address] [employer], spoke to [facility] and she reports they are not accepting admission due to covid outbreak. 11:25A  Met with patient and daughter, Giuseppe Jansen at bedside to provide update and requested additional CHRISTOPHER choices. Choices are [address] [place] [address] and [facility]. Call made to Vivian Gutierrez to make referral but no answer; left message to return the call. Call made to Vivian Gutierrez at [address] [place] [address] and she reports the patient's insurance is out of network. Contacted Ness at [facility], made referral; she will review patient's chart and follow-up. The Plan for Transition of Care is related to the following treatment goals: discharge planning    The Patient and/or patient representative Ilene Garcia was provided with a choice of provider and agrees with the discharge plan. [x] Yes [] No     Freedom of choice list was provided with basic dialogue that supports the patient's individualized plan of care/goals, treatment preferences and shares the quality data associated with the providers.  [x] Yes [] No    Angus Moreno, ADRIANNA, LSW [phone]

## 2022-06-27 NOTE — CARE COORDINATION
Received a call from Laurel Bhakta at [facility] and she reports she is able to accept the patient. Needs updated PT/OT notes, covid test and PASRR. Ambulette form completed and envelope placed on the soft chart. Informed nurse. Updated patient/daughter, Sari Colindres regarding acceptance. PASRR completed and envelope placed on the soft chart.     ADRIANNA Soto, LSW [phone]

## 2022-06-27 NOTE — CARE COORDINATION
Care Coordination:  Pt set up for  5 pm, [employer].  $38.00- this was covered by facility.  Awaiting negative covid, passar In progress    Shady Sandoval

## 2022-06-27 NOTE — DISCHARGE SUMMARY
Hospitalist Discharge Summary    Patient ID: Marni Guerra   Patient : 1935  Patient's PCP: Pavan Hoang MD    Admit Date: 2022   Admitting Physician: Cierra Raya DO    Discharge Date:  2022   Discharge Physician: Elton Villanueva MD   Discharge Condition: Stable  Discharge Disposition: [facility] course in brief:  (Please refer to daily progress notes for a comprehensive review of the hospitalization by requesting medical records)    Patient admitted on 2022 for      Patient presented to the emergency department after a fall. José Miguel Foster was outside on a swing when she got up to walk back to the house and she tripped falling onto her left side.  There is no injury to her head and no loss of consciousness.  She fell on her left hip.  Now complaining of left hip pain.  Denies any chest pain, shortness of breath, fever, chills, nausea, vomiting.  Patient's vital signs reveal tachycardia with a rate of 150.  EKG showed atrial fibrillation with RVR.  Remainder vital signs within normal limits and stable.  Patient has a history of atrial fibrillation.  Was given diltiazem in the emergency department. Kenney Jarviss currently in the 120s.  Patient is chronically anticoagulated on Coumadin.  Laboratory studies show an INR of 2.2.  X-rays show acute displaced fracture of the left femoral neck.  Orthopedics was consulted.  Medicine consulted for admission. Patient underwent left hip hemiarthroplasty on 22. Afterward, the patient did very well. Physical therapy recommended rehab and patient is medically stable for discharge to rehab on . On , patient was noted to have urine retention, urology was consulted, and a Martin was placed with a plan to do a voiding trial.  We will take the Martin out on  and discharge patient to subacute rehab.   There she can be bladder scanned and if she continues to show retention, placed back to Martin and send to urology office for evaluation. At the time of discharge, patient's daughter requested to change Coumadin to Eliquis for long-term anticoagulation. Eliquis started, first dose will be given at nursing home on the night of 6/27/2022. Consults:   IP CONSULT TO ORTHOPEDIC SURGERY  IP CONSULT TO INTERNAL MEDICINE  IP CONSULT TO CARDIOLOGY  IP CONSULT TO SOCIAL WORK  IP CONSULT TO CASE MANAGEMENT  IP CONSULT TO IV TEAM  IP CONSULT TO UROLOGY  IP CONSULT TO PHARMACY    Discharge Diagnoses:    ASSESSMENT:  -Atrial fibrillation with RVR, resolved  -Left femoral neck fracture  -Fall at home  -Chronically anticoagulated on Coumadin  Anemia, acute blood loss anemia, postoperative  Elevated liver enzymes, likely from anesthesia, repeat on the morning of 6/26, improving  -Urine retention, retaining 400 cc on the evening of 6/26        PLAN:  -Telemetry  -Pain management  -Hold Coumadin and anticoagulants  Status post left hip hemiarthroplasty on 6/23/2022  A. fib with RVR improving on the morning of 6/24, plan on weaning of Cardizem drip, Cardizem drip of by the evening of 6/24  Orthopedic agreed with resuming warfarin on 6/27  Urine retention, post void was 150, catheter placed, plan for voiding trial before discharge, if no improvement plan for urodynamic studies in the office    Discharge Instructions / Follow up:    No future appointments.     Continued appropriate risk factor modification of blood pressure, diabetes and serum lipids will remain essential to reducing risk of future atherosclerotic development    Activity: activity as tolerated    Significant labs:  CBC:   Recent Labs     06/25/22  0552 06/26/22  0541 06/27/22  0441   WBC 8.5 9.1 8.3   RBC 3.01* 3.05* 2.97*   HGB 9.3* 9.5* 9.2*   HCT 30.3* 29.8* 29.1*   .7* 97.7 98.0   RDW 13.3 13.3 13.3   * 142 129*     BMP:   Recent Labs     06/25/22  0552 06/25/22  0552 06/26/22  0541 06/27/22  0441     --  137 139   K 4.6  4.6   < > 4.0  4.0 3.9  3.9     -- 100 101   CO2 23  --  23 31*   BUN 27*  --  21 18   CREATININE 0.9  --  0.9 0.8    < > = values in this interval not displayed. LFT:  Recent Labs     06/25/22  0552 06/26/22  0541 06/27/22  0441   PROT 5.2* 5.9* 5.4*   ALKPHOS 74 72 64   * 159* 102*   * 61* 35*   BILITOT 0.5 0.8 0.8     PT/INR:   Recent Labs     06/27/22  1409   INR 1.2     BNP: No results for input(s): BNP in the last 72 hours. Hgb A1C: No results found for: LABA1C  Folate and B12: No results found for: HQWMAVPC13, No results found for: FOLATE  Thyroid Studies: No results found for: TSH, T7OWIHL, C6KPYIS, THYROIDAB    Urinalysis:  No results found for: NITRU, WBCUA, BACTERIA, RBCUA, BLOODU, SPECGRAV, GLUCOSEU    Imaging:  XR HIP LEFT (2-3 VIEWS)    Result Date: 6/23/2022  EXAMINATION: TWO XRAY VIEWS OF THE LEFT HIP 6/23/2022 5:08 pm COMPARISON: 06/23/2022 at 00:29 HISTORY: ORDERING SYSTEM PROVIDED HISTORY: post operative films in pacu TECHNOLOGIST PROVIDED HISTORY: Reason for exam:->post operative films in pacu What reading provider will be dictating this exam?->CRC FINDINGS: Compared to prior exam, there has been interval left hip hemiarthroplasty with adequate alignment. No new fractures visualized. Expected postsurgical changes of the soft tissues are seen. Status post left hip hemiarthroplasty. XR FEMUR LEFT (MIN 2 VIEWS)    Result Date: 6/23/2022  EXAMINATION: XRAY VIEWS OF THE LEFT FEMUR 6/23/2022 12:43 am COMPARISON: None. HISTORY: ORDERING SYSTEM PROVIDED HISTORY: fall TECHNOLOGIST PROVIDED HISTORY: Reason for exam:->fall What reading provider will be dictating this exam?->CRC FINDINGS: Presence of acute displaced fracture of the left femoral neck above the intertrochanteric line. No acute fracture seen in the left femoral shaft. The left knee joint is partially covered on this study there are degenerative changes in the left knee joint with narrowing of the joint space predominant in the medial compartment. There is no left knee joint effusion. .     Acute displaced the fracture of the left femoral neck above the intertrochanteric line     XR CHEST PORTABLE    Result Date: 6/23/2022  EXAMINATION: ONE XRAY VIEW OF THE CHEST 6/23/2022 12:44 am COMPARISON: None. HISTORY: ORDERING SYSTEM PROVIDED HISTORY: fall TECHNOLOGIST PROVIDED HISTORY: Reason for exam:->fall What reading provider will be dictating this exam?->CRC FINDINGS: The heart appears to be with borderline size. Slight prominence of perihilar vessels are seen. No sizable confluent consolidations or infiltrates in the lung parenchyma. There is ectasia tortuous of the thoracic aorta. There is no signs for subcutaneous emphysema, pneumomediastinum or pneumothorax. The outlines of the heart and mediastinum preserved. No acute cardiopulmonary process. XR HIP 2-3 VW W PELVIS LEFT    Result Date: 6/23/2022  EXAMINATION: ONE XRAY VIEW OF THE PELVIS AND TWO XRAY VIEWS LEFT HIP 6/23/2022 12:43 am COMPARISON: None. HISTORY: ORDERING SYSTEM PROVIDED HISTORY: fall TECHNOLOGIST PROVIDED HISTORY: Reason for exam:->fall What reading provider will be dictating this exam?->CRC FINDINGS: Presence of acute the displaced subcapital fracture proximal left femur above the intertrochanteric line. The left femoral head is still in proper position in the left acetabulum. There is a superolateral migration of the proximal left femur which abuts against the left femoral head fragment. No acute fractures in the pelvic bones. No acute fractures in the right hip. Acute displaced the subcapital fracture of the proximal left femur above the intertrochanteric line.        Discharge Medications:      Medication List      START taking these medications    apixaban 5 MG Tabs tablet  Commonly known as: Eliquis  Take 1 tablet by mouth 2 times daily     oxyCODONE-acetaminophen 5-325 MG per tablet  Commonly known as: Percocet  Take 1 tablet by mouth every 6 hours as needed for Pain for up to 7 days. Intended supply: 7 days. Take lowest dose possible to manage pain        CONTINUE taking these medications    buPROPion 200 MG extended release tablet  Commonly known as: WELLBUTRIN SR     calcium carbonate 500 MG Tabs tablet  Commonly known as: OSCAL     dilTIAZem 180 MG extended release capsule  Commonly known as: CARDIZEM CD     folic acid 210 MCG tablet  Commonly known as: FOLVITE     meclizine 25 MG tablet  Commonly known as: ANTIVERT     sertraline 100 MG tablet  Commonly known as: ZOLOFT        STOP taking these medications    warfarin 2.5 MG tablet  Commonly known as: COUMADIN     warfarin 5 MG tablet  Commonly known as: COUMADIN           Where to Get Your Medications      You can get these medications from any pharmacy    Bring a paper prescription for each of these medications  · oxyCODONE-acetaminophen 5-325 MG per tablet     Information about where to get these medications is not yet available    Ask your nurse or doctor about these medications  · apixaban 5 MG Tabs tablet         Time Spent on discharge is more than 45 minutes in the examination, evaluation, counseling and review of medications and discharge plan.    +++++++++++++++++++++++++++++++++++++++++++++++++  Judy Holland MD  [address]  +++++++++++++++++++++++++++++++++++++++++++++++++  NOTE: This report was transcribed using voice recognition software. Every effort was made to ensure accuracy; however, inadvertent computerized transcription errors may be present.

## 2022-06-27 NOTE — PROGRESS NOTES
Hospitalist Progress Note      SYNOPSIS: Patient admitted on 2022 for     Patient presented to the emergency department after a fall. Patient was outside on a swing when she got up to walk back to the house and she tripped falling onto her left side. There is no injury to her head and no loss of consciousness. She fell on her left hip. Now complaining of left hip pain. Denies any chest pain, shortness of breath, fever, chills, nausea, vomiting. Patient's vital signs reveal tachycardia with a rate of 150. EKG showed atrial fibrillation with RVR. Remainder vital signs within normal limits and stable. Patient has a history of atrial fibrillation. Was given diltiazem in the emergency department. Rates currently in the 120s. Patient is chronically anticoagulated on Coumadin. Laboratory studies show an INR of 2.2. X-rays show acute displaced fracture of the left femoral neck. Orthopedics was consulted. Medicine consulted for admission. Patient underwent left hip hemiarthroplasty on 22. SUBJECTIVE:    Patient seen and examined in her room. Patient appears comfortable. Was noted to have urine retention of 400 cc on the evening of . Denies any chest pain, nausea, vomiting. Records reviewed. Stable overnight. No other overnight issues reported. Temp (24hrs), Av.1 °F (36.7 °C), Min:97.5 °F (36.4 °C), Max:98.5 °F (36.9 °C)    DIET: ADULT DIET; Regular  CODE: Full Code    Intake/Output Summary (Last 24 hours) at 2022 0802  Last data filed at 2022 0531  Gross per 24 hour   Intake 470 ml   Output 1350 ml   Net -880 ml       OBJECTIVE:    /69   Pulse 77   Temp 97.5 °F (36.4 °C) (Oral)   Resp 16   Ht 5' 6\" (1.676 m)   Wt 203 lb (92.1 kg)   SpO2 96%   BMI 32.77 kg/m²     General appearance: No apparent distress, appears stated age and cooperative. HEENT:  Conjunctivae/corneas clear. Neck: Supple. No jugular venous distention.    Respiratory: Clear to auscultation bilaterally, normal respiratory effort  Cardiovascular: Regular rate rhythm, normal S1-S2  Abdomen: Soft, nontender, nondistended  Musculoskeletal: No clubbing, cyanosis, no bilateral lower extremity edema. Brisk capillary refill. Skin:  No rashes  on visible skin  Neurologic: awake, alert and following commands       ASSESSMENT:  -Atrial fibrillation with RVR, resolved  -Left femoral neck fracture  -Fall at home  -Chronically anticoagulated on Coumadin  Anemia, acute blood loss anemia, postoperative  Elevated liver enzymes, likely from anesthesia, repeat on the morning of 6/26, improving  -Urine retention, retaining 400 cc on the evening of 6/26        PLAN:  -Telemetry  -Pain management  -Hold Coumadin and anticoagulants  Status post left hip hemiarthroplasty on 6/23/2022  A. fib with RVR improving on the morning of 6/24, plan on weaning of Cardizem drip, Cardizem drip of by the evening of 6/24  Waiting for orthopedic recommendations regarding restart of Eliquis  Urine retention, post void was 150, catheter placed, plan for voiding trial before discharge, if no improvement plan for urodynamic studies in the office       DISPOSITION:   Medically stable for discharge on 6/27/2022.     Medications:  REVIEWED DAILY    Infusion Medications    sodium chloride       Scheduled Medications    sennosides-docusate sodium  1 tablet Oral Daily    buPROPion  200 mg Oral Daily    dilTIAZem  180 mg Oral BID    sertraline  100 mg Oral Daily    sodium chloride flush  10 mL IntraVENous 2 times per day     PRN Meds: sodium chloride flush, sodium chloride, promethazine **OR** ondansetron, polyethylene glycol, oxyCODONE-acetaminophen, morphine    Labs:     Recent Labs     06/25/22  0552 06/26/22  0541 06/27/22  0441   WBC 8.5 9.1 8.3   HGB 9.3* 9.5* 9.2*   HCT 30.3* 29.8* 29.1*   * 142 129*       Recent Labs     06/25/22  0552 06/25/22  0552 06/26/22  0541 06/27/22  0441     --  137 139   K 4.6  4.6 < > 4.0  4.0 3.9  3.9     --  100 101   CO2 23  --  23 31*   BUN 27*  --  21 18   CREATININE 0.9  --  0.9 0.8   CALCIUM 8.2*  --  8.5* 8.3*    < > = values in this interval not displayed. Recent Labs     06/25/22  0552 06/26/22  0541 06/27/22  0441   PROT 5.2* 5.9* 5.4*   ALKPHOS 74 72 64   * 159* 102*   * 61* 35*   BILITOT 0.5 0.8 0.8       No results for input(s): INR in the last 72 hours. No results for input(s): Emy Benjamin in the last 72 hours. Chronic labs:    Lab Results   Component Value Date    INR 2.2 06/23/2022       Radiology: REVIEWED DAILY    +++++++++++++++++++++++++++++++++++++++++++++++++  Vince Bonilla MD  [facility] Physician - [address] [place], [place]  +++++++++++++++++++++++++++++++++++++++++++++++++  NOTE: This report was transcribed using voice recognition software. Every effort was made to ensure accuracy; however, inadvertent computerized transcription errors may be present.

## 2022-06-27 NOTE — PROGRESS NOTES
Occupational Therapy  OT BEDSIDE TREATMENT NOTE   [address], [place] [address]  [address] [place] [address]        Date:2022  Patient Name: Lisa Deluca  MRN: [id number]  : 1935  Room: [address] [place] [address]     Per OT Eval:    Evaluating OT: Ellen Mckinney OTR/L #[id number]      Referring Provider: Cortez Ma DO  Specific Provider Orders/Date: OT eval and treat 22     Diagnosis: Atrial fibrillation with RVR ([address] [I48.91]  Left displaced femoral neck fracture ([address] Nikolai Rayo  Fall, initial encounter [W19. XXXA]   Pt admitted to hospital following fall     Surgery / Procedure: Left [facility] 2022        Pertinent Medical History:  has a past medical history of Atrial fibrillation ([address]       Precautions:  Fall Risk, WBAT L LE, posterior hip precautions, O2     Assessment of current deficits    [x]? Functional mobility          [x]? ADLs           [x]? Strength                  []?Cognition    [x]? Functional transfers        [x]? IADLs         [x]? Safety Awareness   [x]? Endurance    []? Fine Coordination                        [x]? Balance      []? Vision/perception   []? Sensation      []? Gross Motor Coordination            []? ROM           []?  Delirium                   []? Motor Control      OT PLAN OF CARE   OT POC based on physician orders, patient diagnosis and results of clinical assessment     Frequency/Duration 1-3 days/wk for 2 weeks PRN   Specific OT Treatment Interventions to include:   * Instruction/training on adapted ADL techniques and AE recommendations to increase functional independence within precautions       * Training on energy conservation strategies, correct breathing pattern and techniques to improve independence/tolerance for self-care routine  * Functional transfer/mobility training/DME recommendations for increased independence, safety, and fall prevention  * Patient/Family education to increase follow through with safety techniques and functional independence  * Recommendation of environmental modifications for increased safety with functional transfers/mobility and ADLs  * Cognitive retraining/development of therapeutic activities to improve problem solving, judgement, memory, and attention for increased safety/participation in ADL/IADL tasks  * Therapeutic exercise to improve motor endurance, ROM, and functional strength for ADLs/functional transfers  * Therapeutic activities to facilitate/challenge dynamic balance, stand tolerance for increased safety and independence with ADLs  * Therapeutic activities to facilitate gross/fine motor skills for increased independence with ADLs  * Neuro-muscular re-education: facilitation of righting/equilibrium reactions, midline orientation, scapular stability/mobility, normalization of muscle tone, and facilitation of volitional active controled movement     Recommended Adaptive Equipment:  TBD      Home Living: Pt lives with  in a ground level apartment with level entry     Bathroom setup: walk-in shower with seat    Equipment owned: rollator, shower chair     Prior Level of Function: Independent with ADLs , Independent with IADLs; ambulated without AD in apartment and rollator in community    Driving: yes   Occupation: enjoys reading the bible and playing cards     Pain Level: Pt complained of minimal LLE pain this session  Cognition: A&O: 4/4; Follows 1-2 step directions             Memory:  Fair+             Sequencing:  fair             Problem solving:  Fair-             Judgement/safety:  Fair-             Mild delay in processing                Functional Assessment:  AM-PAC Daily Activity Raw Score: 15/24    Initial Eval Status  Date: 6/24/22 Treatment Status  Date: 6/27/22 STGs = LTGs  Time frame: 10-14 days   Feeding Independent   Independent     Grooming Minimal Assist  SBA  Pt washed face, applied deodorant, combed hair seated EOB Modified [place]    UB Dressing Minimal Assist seen this session. Pt compelted of bed mobility, functional mobility of short steps, transfers and ADL tasks this session. At end of session, pt seated upright in chair eating of lunch meal, all lines and tubes intact, call light within reach, daughter present. · Pt has made fair progress towards set goals.    · Continue with current plan of care focusing on increasing of independency with transfers and ADL tasks      Treatment Time In: 12:55pm           Treatment Time Out: 1:25pm              Treatment Charges: Mins Units   Ther Ex  [id number]     Manual Therapy [id number]     Thera Activities 15491 8 1   ADL/Home Mgt [id number] 22 1   Neuro Re-ed 11680     Group Therapy      Orthotic manage/training  87120     Non-Billable Time     Total Timed Treatment 30 2        Luisa Emerson SCHWAB/L [id number]

## 2022-06-27 NOTE — PROGRESS NOTES
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 Weight:   Wt Readings from Last 1 Encounters:   06/27/22 203 lb (92.1 kg)       TSH:  No results found for: TSH    Hepatic Function Panel:                            Lab Results   Component Value Date    ALKPHOS 64 06/27/2022     06/27/2022    AST 35 06/27/2022    PROT 5.4 06/27/2022    BILITOT 0.8 06/27/2022    LABALBU 3.1 06/27/2022       Current warfarin drug-drug interactions include: No significant interactions    Recent Labs     06/25/22  0552 06/26/22  0541 06/27/22  0441   HGB 9.3* 9.5* 9.2*   * 142 129*     Date Warfarin Dose INR Heparin or LMWH Comment   6/23 -- 2.2 -- Held for surgical intervention   6/24 -- -- --    6/25 -- -- --    6/26 -- -- --    6/27 <2.5 mg> 1.2 -- Pharmacy consulted     Assessment:  · Patient is a 82 y.o. female on warfarin for Atrial Fibrillation. Patient's home warfarin dosing regimen is 2.5 mg on Mon/Wed/Fri and 5 mg on Sun/Tue/Thu/Sat.    · Goal INR 2 - 3  · INR 1.2 today    Plan:  · Warfarin 2.5mg tonight  · Daily PT/INR until the INR is stable within the therapeutic range  · Pharmacist will follow and monitor/adjust dosing as necessary    Thank you for this consult,    Arianna Rosa, [facility] 6/27/2022 12:46 PM

## 2022-06-27 NOTE — CARE COORDINATION
Care Coordination: called for updated therapy notes, pt is [employer], family in room, checking on dc plan, pt did walk 30 feet wit ww with minimal assist on 6/25/22- may be able to dc to home with [facility]. Will await therapy recommendations.  Will also check downgrade to med surg    Isaac Alaniz

## 2022-06-27 NOTE — DISCHARGE INSTR - COC
Continuity of Care Form    Patient Name: Sanford Lion   :  1935  MRN:  [id number]    [facility] date:  2022  Discharge date:  2022    Code Status Order: Full Code   Advance Directives:      Admitting Physician:  Edson Martini DO  PCP: Deisi Hylton MD    Discharging Nurse: Kita Culp 23 Unit/Room#: 1427/5751-Q  Discharging Unit Phone Number: [phone]    Emergency Contact:   Extended Emergency Contact Information  Primary Emergency Contact: [address] Phone: [phone]  Relation: Child  Preferred language: English   needed? No  Secondary Emergency Contact: Julio Gardner, Ismael Marty Phone: [phone]  Relation: Child  Preferred language: English   needed? No    Past Surgical History:  Past Surgical History:   Procedure Laterality Date    HIP SURGERY Left 2022    HIP HEMIARTHROPLASTY performed by Shade Carrillo MD at [address] [place] [address]       Immunization History: There is no immunization history on file for this patient. Active Problems:  Patient Active Problem List   Diagnosis Code    Atrial fibrillation with RVR [facility]) I48.91    Fall W19. Chacha Velazquez       Isolation/Infection:   Isolation            No Isolation          Patient Infection Status       None to display            Nurse Assessment:  Last Vital Signs: /69   Pulse 77   Temp 97.5 °F (36.4 °C) (Oral)   Resp 18   Ht 5' 6\" (1.676 m)   Wt 203 lb (92.1 kg)   SpO2 96%   BMI 32.77 kg/m²     Last documented pain score (0-10 scale): Pain Level: 8  Last Weight:   Wt Readings from Last 1 Encounters:   22 203 lb (92.1 kg)     Mental Status:  {IP PT MENTAL STATUS:12170}    IV Access:  {[facility] IV ACCESS:[id number]}    Nursing Mobility/ADLs:  Walking   Assisted  Transfer  Assisted  Bathing  Assisted  Dressing  Assisted  Toileting  Assisted  Feeding  Assisted  Med Admin  Assisted  Med Delivery   none    Wound Care Documentation and Therapy:  Incision 22 Hip Left (Active)   Dressing Status Clean;Dry; Intact 06/27/22 0731   Dressing/Treatment Silver dressing 06/27/22 0731   Drainage Amount None 06/27/22 0731   Odor None 06/27/22 0731   Mana-incision Assessment Ecchymosis 06/27/22 0731   Number of days: 3        Elimination:  Continence: Bowel: Yes  Bladder: Yes  Urinary Catheter: None, Removal Date 06/27/2022 with PVR 18 cc, and Indication for Use of Catheter: Acute urinary retention/obstruction   Colostomy/Ileostomy/Ileal Conduit: No       Date of Last BM: 06/26/2022    Intake/Output Summary (Last 24 hours) at 6/27/2022 1519  Last data filed at 6/27/2022 0731  Gross per 24 hour   Intake 590 ml   Output 1200 ml   Net -610 ml     I/O last 3 completed shifts: In: 200 [P.O.:770]  Out: 1950 [Urine:1950]    Safety Concerns:     History of Falls (last 30 days)    Impairments/Disabilities:      None    Nutrition Therapy:  Current Nutrition Therapy:   - Oral Diet:  General    Routes of Feeding: Oral  Liquids: Thin Liquids  Daily Fluid Restriction: no  Last Modified Barium Swallow with Video (Video Swallowing Test): not done    Treatments at the Time of Hospital Discharge:   Respiratory Treatments: none  Oxygen Therapy:  is not on home oxygen therapy.   Ventilator:    - No ventilator support    Rehab Therapies: Physical Therapy, Occupational Therapy, and Speech/Language Therapy  Weight Bearing Status/Restrictions: No weight bearing restrictions  Other Medical Equipment (for information only, NOT a DME order):  wheelchair, walker, bedside commode, and hospital bed  Other Treatments: n/a    Patient's personal belongings (please select all that are sent with patient):  Glasses    RN SIGNATURE:  Electronically signed by Diego Mcduffie RN on 6/27/22 at 4:51 PM EDT    CASE MANAGEMENT/SOCIAL WORK SECTION    Inpatient Status Date: 06/22/2022    Readmission Risk Assessment Score:  Readmission Risk              Risk of Unplanned Readmission:  12           Discharging to Facility/ Agency   Name: Address:  Phone:  Fax:    Dialysis Facility (if applicable)   Name:  Address:  Dialysis Schedule:  Phone:  Fax:    / signature: {Esignature:[id number]}    PHYSICIAN SECTION    Prognosis: Fair    Condition at Discharge: Stable    Rehab Potential (if transferring to Rehab): Fair    Recommended Labs or Other Treatments After Discharge: CBC, BMP in 2 to 3 days, bladder scan on the evening of 6/27, INR daily with a home dose Coumadin to titrate INR between 2 and 3    Physician Certification: I certify the above information and transfer of Sandi Foreman  is necessary for the continuing treatment of the diagnosis listed and that she requires [facility] for less 30 days.      Update Admission H&P: No change in H&P    PHYSICIAN SIGNATURE:  Electronically signed by Naif Mehta MD on 6/27/22 at 3:19 PM EDT

## 2022-06-27 NOTE — CARE COORDINATION
Care Coordination:  Awaiting urology for voiding trial, awaiting orthopedic for Eliquis, in meantime, downgrade to med surg. Awaiting timing of dc and medical stability as pt has been accepted with precert pending once it has been established that pt is medically clear to dc    Fredrick Mauro    Addendum: discussed above plan with SW, she has also spoke to Attending RN regarding the need of the above and assist to facilitate.  I will also touch base with Charge nurse as well for any further assist    Fredrick Mauro

## 2022-07-05 NOTE — ANESTHESIA PROCEDURE NOTES
Airway  Urgency: elective    Airway not difficult    General Information and Staff    Patient location during procedure: OR  Performed: resident/CRNA     Indications and Patient Condition  Indications for airway management: anesthesia  Preoxygenated: yes  Patient position: sniffing  Mask difficulty assessment: vent by bag mask    Final Airway Details  Final airway type: endotracheal airway      Successful airway: ETT  Cuffed: yes   Successful intubation technique: direct laryngoscopy  Endotracheal tube insertion site: oral  Cormack-Lehane Classification: grade IIb - view of arytenoids or posterior of glottis only  Placement verified by: chest auscultation and capnometry   Measured from: teeth  Number of attempts at approach: 1  Ventilation between attempts: bag mask  Number of other approaches attempted: 0    no

## (undated) DEVICE — COVER BOOT L REG BLU SMS POLYPR KNEE HI E FLD RESIST

## (undated) DEVICE — Device

## (undated) DEVICE — 3M™ TEGADERM™ TRANSPARENT FILM DRESSING FRAME STYLE, 1626W, 4 IN X 4-3/4 IN (10 CM X 12 CM), 50/CT 4CT/CASE: Brand: 3M™ TEGADERM™

## (undated) DEVICE — 3M™ IOBAN™ 2 ANTIMICROBIAL INCISE DRAPE 6651EZ: Brand: IOBAN™ 2

## (undated) DEVICE — BIT DRILL TWIST 5.0MM 7/64 IN

## (undated) DEVICE — GAUZE,SPONGE,4"X4",8PLY,STRL,LF,10/TRAY: Brand: MEDLINE

## (undated) DEVICE — APPLICATOR MEDICATED 26 CC SOLUTION HI LT ORNG CHLORAPREP

## (undated) DEVICE — 2108 SERIES SAGITTAL BLADE, OFFSET (20.0 X 0.89 X 80.0MM)

## (undated) DEVICE — GOWN,BREATHABLE SLV,AURORA,XLG,STRL: Brand: MEDLINE

## (undated) DEVICE — BOWL AND CEMENT CARTRIDGE WITH BREAKAWAY FEMORAL NOZZLE: Brand: ACM

## (undated) DEVICE — STRYKER PERFORMANCE SERIES SAGITTAL BLADE: Brand: STRYKER PERFORMANCE SERIES

## (undated) DEVICE — GLOVE ORANGE PI 8   MSG9080

## (undated) DEVICE — SOLUTION IRRIG 3000ML 0.9% SOD CHL USP UROMATIC PLAS CONT

## (undated) DEVICE — 3M™ IOBAN™ 2 ANTIMICROBIAL INCISE DRAPE 6650EZ: Brand: IOBAN™ 2

## (undated) DEVICE — STRIP,CLOSURE,WOUND,MEDI-STRIP,1/2X4: Brand: MEDLINE

## (undated) DEVICE — SYRINGE MED 50ML LUERSLIP TIP

## (undated) DEVICE — DRESSING HYDROFIBER AQUACEL AG ADVANTAGE 3.5X10 IN

## (undated) DEVICE — HEWSON SUTURE RETRIEVER: Brand: HEWSON SUTURE RETRIEVER

## (undated) DEVICE — GOWN,SIRUS,POLYRNF,BRTHSLV,XLN/XL,20/CS: Brand: MEDLINE

## (undated) DEVICE — NEEDLE HYPO 21GA L1.5IN GRN POLYPR HUB S STL REG BVL STR

## (undated) DEVICE — SOLUTION IV IRRIG POUR BRL 0.9% SODIUM CHL 2F7124

## (undated) DEVICE — PILLOW POS W15XH6XL22IN RASPBERRY FOAM ABD W/ STRP DISP FOR

## (undated) DEVICE — ADHESIVE SKIN CLOSURE TOP 36 CC HI VISC DERMBND MINI

## (undated) DEVICE — BIT DRL L5IN DIA2.8MM STD ST S STL TWST BUSA

## (undated) DEVICE — SYRINGE 20ML LL S/C 50

## (undated) DEVICE — TOWEL,OR,DSP,ST,BLUE,DLX,10/PK,8PK/CS: Brand: MEDLINE

## (undated) DEVICE — TOTAL HIP-LF: Brand: MEDLINE INDUSTRIES, INC.

## (undated) DEVICE — READY WET SKIN SCRUB TRAY-LF: Brand: MEDLINE INDUSTRIES, INC.

## (undated) DEVICE — SOLUTION IV IRRIG 1000ML POUR BTL 2F7114

## (undated) DEVICE — BASIC SINGLE BASIN 1-LF: Brand: MEDLINE INDUSTRIES, INC.

## (undated) DEVICE — SYRINGE MED 10ML LUERLOCK TIP W/O SFTY DISP

## (undated) DEVICE — SPONGE LAP W18XL18IN WHT COT 4 PLY FLD STRUNG RADPQ DISP ST

## (undated) DEVICE — GOWN,AURORA,BRTHSLV,2XL,18/CS: Brand: MEDLINE